# Patient Record
Sex: MALE | Race: WHITE | NOT HISPANIC OR LATINO
[De-identification: names, ages, dates, MRNs, and addresses within clinical notes are randomized per-mention and may not be internally consistent; named-entity substitution may affect disease eponyms.]

---

## 2017-01-09 ENCOUNTER — BH HISTORICAL (OUTPATIENT)
Dept: OTHER | Age: 27
End: 2017-01-09

## 2017-04-10 ENCOUNTER — BH HISTORICAL (OUTPATIENT)
Dept: OTHER | Age: 27
End: 2017-04-10

## 2017-05-25 ENCOUNTER — LAB SERVICES (OUTPATIENT)
Dept: OTHER | Age: 27
End: 2017-05-25

## 2017-05-26 ENCOUNTER — CHARTING TRANS (OUTPATIENT)
Dept: OTHER | Age: 27
End: 2017-05-26

## 2017-05-26 LAB
ALBUMIN SERPL-MCNC: 4.2 G/DL (ref 3.6–5.1)
ALBUMIN/GLOB SERPL: 1.3 (ref 1–2.4)
ALP SERPL-CCNC: 105 UNITS/L (ref 45–117)
ALT SERPL-CCNC: 43 UNITS/L
ANION GAP SERPL CALC-SCNC: 11 MMOL/L (ref 10–20)
AST SERPL-CCNC: 20 UNITS/L
BILIRUB SERPL-MCNC: 1 MG/DL (ref 0.2–1)
BUN SERPL-MCNC: 19 MG/DL (ref 6–20)
BUN/CREAT SERPL: 17 (ref 7–25)
CALCIUM SERPL-MCNC: 9.4 MG/DL (ref 8.4–10.2)
CHLORIDE SERPL-SCNC: 105 MMOL/L (ref 98–107)
CHOLEST SERPL-MCNC: 203 MG/DL
CHOLEST/HDLC SERPL: 4.2
CO2 SERPL-SCNC: 28 MMOL/L (ref 21–32)
CREAT SERPL-MCNC: 1.1 MG/DL (ref 0.67–1.17)
GLOBULIN SER-MCNC: 3.3 G/DL (ref 2–4)
GLUCOSE SERPL-MCNC: 90 MG/DL (ref 65–99)
HDLC SERPL-MCNC: 48 MG/DL
LDLC SERPL CALC-MCNC: 127 MG/DL
LENGTH OF FAST TIME PATIENT: ABNORMAL HRS
LENGTH OF FAST TIME PATIENT: ABNORMAL HRS
NONHDLC SERPL-MCNC: 155 MG/DL
POTASSIUM SERPL-SCNC: 4.4 MMOL/L (ref 3.4–5.1)
SODIUM SERPL-SCNC: 140 MMOL/L (ref 135–145)
TESTOST SERPL-MCNC: 300.9 NG/DL (ref 280–1100)
TOTAL PROTEIN: 7.5 G/DL (ref 6.4–8.2)
TRIGL SERPL-MCNC: 141 MG/DL

## 2017-06-09 ENCOUNTER — CHARTING TRANS (OUTPATIENT)
Dept: OTHER | Age: 27
End: 2017-06-09

## 2017-06-14 ENCOUNTER — BH HISTORICAL (OUTPATIENT)
Dept: OTHER | Age: 27
End: 2017-06-14

## 2017-09-11 ENCOUNTER — BH HISTORICAL (OUTPATIENT)
Dept: OTHER | Age: 27
End: 2017-09-11

## 2017-10-10 ENCOUNTER — LAB SERVICES (OUTPATIENT)
Dept: OTHER | Age: 27
End: 2017-10-10

## 2017-10-10 ENCOUNTER — CHARTING TRANS (OUTPATIENT)
Dept: OTHER | Age: 27
End: 2017-10-10

## 2017-10-13 PROCEDURE — 84403 ASSAY OF TOTAL TESTOSTERONE: CPT | Performed by: UROLOGY

## 2017-10-13 PROCEDURE — 36415 COLL VENOUS BLD VENIPUNCTURE: CPT | Performed by: UROLOGY

## 2017-10-17 ENCOUNTER — CHARTING TRANS (OUTPATIENT)
Dept: OTHER | Age: 27
End: 2017-10-17

## 2017-10-17 LAB — RAPID STREP GROUP A: NORMAL

## 2017-12-11 ENCOUNTER — BH HISTORICAL (OUTPATIENT)
Dept: OTHER | Age: 27
End: 2017-12-11

## 2018-02-12 ENCOUNTER — BH HISTORICAL (OUTPATIENT)
Dept: OTHER | Age: 28
End: 2018-02-12

## 2018-04-23 ENCOUNTER — BH HISTORICAL (OUTPATIENT)
Dept: OTHER | Age: 28
End: 2018-04-23

## 2018-10-09 PROBLEM — F33.42 RECURRENT MAJOR DEPRESSIVE DISORDER, IN FULL REMISSION: Status: ACTIVE | Noted: 2018-10-09

## 2018-10-09 PROBLEM — F33.42 RECURRENT MAJOR DEPRESSIVE DISORDER, IN FULL REMISSION (HCC): Status: ACTIVE | Noted: 2018-10-09

## 2018-11-02 VITALS
TEMPERATURE: 98.1 F | BODY MASS INDEX: 41.75 KG/M2 | HEIGHT: 73 IN | SYSTOLIC BLOOD PRESSURE: 112 MMHG | WEIGHT: 315 LBS | DIASTOLIC BLOOD PRESSURE: 80 MMHG | HEART RATE: 92 BPM

## 2018-11-03 VITALS
HEIGHT: 73 IN | BODY MASS INDEX: 41.75 KG/M2 | SYSTOLIC BLOOD PRESSURE: 126 MMHG | HEART RATE: 84 BPM | DIASTOLIC BLOOD PRESSURE: 80 MMHG | WEIGHT: 315 LBS | OXYGEN SATURATION: 98 % | TEMPERATURE: 99 F | RESPIRATION RATE: 16 BRPM

## 2019-04-19 ENCOUNTER — TELEPHONE (OUTPATIENT)
Dept: BEHAVIORAL HEALTH | Age: 29
End: 2019-04-19

## 2019-04-28 ENCOUNTER — E-ADVICE (OUTPATIENT)
Dept: BEHAVIORAL HEALTH | Age: 29
End: 2019-04-28

## 2019-04-29 RX ORDER — VENLAFAXINE HYDROCHLORIDE 150 MG/1
150 CAPSULE, EXTENDED RELEASE ORAL DAILY
Qty: 90 CAPSULE | Refills: 0 | Status: SHIPPED | OUTPATIENT
Start: 2019-04-29 | End: 2019-07-17 | Stop reason: SDUPTHER

## 2019-04-29 RX ORDER — VENLAFAXINE HYDROCHLORIDE 150 MG/1
150 CAPSULE, EXTENDED RELEASE ORAL DAILY
Qty: 30 CAPSULE | Refills: 0 | Status: SHIPPED | OUTPATIENT
Start: 2019-04-29 | End: 2019-05-15 | Stop reason: SDUPTHER

## 2019-05-15 ENCOUNTER — OFFICE VISIT (OUTPATIENT)
Dept: BEHAVIORAL HEALTH | Age: 29
End: 2019-05-15

## 2019-05-15 DIAGNOSIS — F33.42 MAJOR DEPRESSIVE DISORDER, RECURRENT EPISODE, IN FULL REMISSION (CMD): Primary | ICD-10-CM

## 2019-05-15 PROCEDURE — 99213 OFFICE O/P EST LOW 20 MIN: CPT | Performed by: PSYCHIATRY & NEUROLOGY

## 2019-05-20 ENCOUNTER — LAB ENCOUNTER (OUTPATIENT)
Dept: LAB | Age: 29
End: 2019-05-20
Attending: FAMILY MEDICINE
Payer: COMMERCIAL

## 2019-05-20 ENCOUNTER — OFFICE VISIT (OUTPATIENT)
Dept: FAMILY MEDICINE CLINIC | Facility: CLINIC | Age: 29
End: 2019-05-20
Payer: COMMERCIAL

## 2019-05-20 VITALS
HEART RATE: 76 BPM | WEIGHT: 315 LBS | SYSTOLIC BLOOD PRESSURE: 136 MMHG | DIASTOLIC BLOOD PRESSURE: 88 MMHG | TEMPERATURE: 98 F | HEIGHT: 73 IN | RESPIRATION RATE: 14 BRPM | BODY MASS INDEX: 41.75 KG/M2

## 2019-05-20 DIAGNOSIS — H92.02 ACUTE OTALGIA, LEFT: ICD-10-CM

## 2019-05-20 DIAGNOSIS — R14.0 ABDOMINAL BLOATING: ICD-10-CM

## 2019-05-20 DIAGNOSIS — R14.0 ABDOMINAL BLOATING: Primary | ICD-10-CM

## 2019-05-20 PROCEDURE — 99202 OFFICE O/P NEW SF 15 MIN: CPT | Performed by: FAMILY MEDICINE

## 2019-05-20 PROCEDURE — 80053 COMPREHEN METABOLIC PANEL: CPT

## 2019-05-20 PROCEDURE — 85025 COMPLETE CBC W/AUTO DIFF WBC: CPT

## 2019-05-20 PROCEDURE — 83690 ASSAY OF LIPASE: CPT

## 2019-05-20 PROCEDURE — 36415 COLL VENOUS BLD VENIPUNCTURE: CPT

## 2019-05-20 RX ORDER — SACCHAROMYCES BOULARDII 250 MG
250 CAPSULE ORAL DAILY
COMMUNITY

## 2019-05-20 RX ORDER — MAGNESIUM OXIDE 400 MG (241.3 MG MAGNESIUM) TABLET
400 TABLET DAILY
COMMUNITY

## 2019-05-20 RX ORDER — POTASSIUM CHLORIDE 750 MG/1
10 TABLET, EXTENDED RELEASE ORAL DAILY
COMMUNITY

## 2019-05-20 NOTE — PROGRESS NOTES
[facility] Family Medicine Office Note  Chief Complaint:   Patient presents with:  Ear Problem: left ear decrease hearing  Diarrhea: loose stools x 2 days, feels bloated      HPI:   This is a 30year old male coming in for abdominal bloating and Disp: 30 capsule Rfl: 2      Counseling given: Not Answered       REVIEW OF SYSTEMS:   ROS:  CONSTITUTIONAL:  Denies any unusual weight gain/loss, fever, chills, weakness or fatigue.   HEENT:  Eyes:  Denies visual loss, blurred vision, double vision or yell lower extremities, no edema noted  NEURO:  CN 2 - 12 grossly intact     ASSESSMENT AND PLAN:   1.  Abdominal bloating  -  Etiology unclear  -  Check CMP/CBC/lipase  -  H/o cholecystectomy  -  Check US or CT abdomen if labs are normal and symptoms persist  -

## 2019-06-21 ENCOUNTER — APPOINTMENT (OUTPATIENT)
Dept: LAB | Age: 29
End: 2019-06-21
Attending: FAMILY MEDICINE
Payer: COMMERCIAL

## 2019-06-21 ENCOUNTER — OFFICE VISIT (OUTPATIENT)
Dept: FAMILY MEDICINE CLINIC | Facility: CLINIC | Age: 29
End: 2019-06-21
Payer: COMMERCIAL

## 2019-06-21 VITALS
DIASTOLIC BLOOD PRESSURE: 80 MMHG | BODY MASS INDEX: 43.13 KG/M2 | TEMPERATURE: 100 F | RESPIRATION RATE: 18 BRPM | SYSTOLIC BLOOD PRESSURE: 124 MMHG | HEART RATE: 76 BPM | WEIGHT: 315 LBS | HEIGHT: 71.5 IN

## 2019-06-21 DIAGNOSIS — Z00.00 ROUTINE GENERAL MEDICAL EXAMINATION AT A HEALTH CARE FACILITY: Primary | ICD-10-CM

## 2019-06-21 DIAGNOSIS — E66.01 MORBID OBESITY WITH BMI OF 45.0-49.9, ADULT (HCC): ICD-10-CM

## 2019-06-21 DIAGNOSIS — Z00.00 ROUTINE GENERAL MEDICAL EXAMINATION AT A HEALTH CARE FACILITY: ICD-10-CM

## 2019-06-21 PROCEDURE — 81003 URINALYSIS AUTO W/O SCOPE: CPT

## 2019-06-21 PROCEDURE — 99395 PREV VISIT EST AGE 18-39: CPT | Performed by: FAMILY MEDICINE

## 2019-06-21 PROCEDURE — 36415 COLL VENOUS BLD VENIPUNCTURE: CPT

## 2019-06-21 PROCEDURE — 80061 LIPID PANEL: CPT

## 2019-06-21 PROCEDURE — 84443 ASSAY THYROID STIM HORMONE: CPT

## 2019-06-21 NOTE — PROGRESS NOTES
Mercedes Yuen is a 30year old male who presents for a complete physical exam.   HPI:   Pt complains of nothing today. Denies any recent illnesses or hospitalizations. States his father has diabetes.   He is not aware of any prostate or colon cancer in x10(3) uL    Eosinophil Absolute 0.28 0.00 - 0.70 x10(3) uL    Basophil Absolute 0.08 0.00 - 0.20 x10(3) uL    Immature Granulocyte Absolute 0.03 0.00 - 1.00 x10(3) uL    Neutrophil % 55.1 %    Lymphocyte % 33.2 %    Monocyte % 8.2 %    Eosinophil % 2.5 % denies n/v/d/c/blood in stool  : denies nocturia or changes in stream  MUSCULOSKELETAL: denies back pain  NEURO: denies headaches, denies LH/dizziness/syncope  PSYCHE: denies depression or anxiety  HEMATOLOGIC: denies hx of anemia  ENDOCRINE: denies thyr

## 2019-06-21 NOTE — PATIENT INSTRUCTIONS
Prevention Guidelines, Men Ages 25 to 44  Screening tests and vaccines are an important part of managing your health. A screening test is done to find possible disorders or diseases in people who don't have any symptoms.  The goal is to find a disease ear Vaccines Who needs it How often   Chickenpox (varicella) All men in this age group who have no record of this infection or vaccine 2 doses; the second dose should be given at least 4 weeks after the first dose   Hepatitis A Men at increased risk for infect Sexually transmitted infection prevention Men who are sexually active At routine exams   Skin cancer Prevention of skin cancer in fair-skinned adults through age [address] At routine exams   1Those who are 25years of age, who are not up-to-date on their childhoo · Exercise decreases stress and helps you sleep better. Find out for yourself what time of day works best for you. Make exercise fun  Exercise can be fun. Choose an activity you enjoy.  You may even get a friend to do it with you:  · Take a resistance-brinda · Vegetables and fruits  · Whole-grain or bran breads, pastas, and cereals  · Legumes (beans) and peas  As you start to eat more fiber, be sure to drink plenty of water to keep your digestive system working smoothly.   Tips  Do's and don'ts include:   · Edith Maya · Calories are the fuel your body burns for energy. You get the calories you need from the food you eat. For healthy weight loss, women should eat at least 1,200 calories a day, men at least 1,500.   · When you eat more calories than you need, your body sto © 8176-3201 [employer] [address] [facility], [facility] [place]. All rights reserved. This information is not intended as a substitute for professional medical care. Always follow your healthcare professional's instructions.

## 2019-07-17 ENCOUNTER — TELEPHONE (OUTPATIENT)
Dept: BEHAVIORAL HEALTH | Age: 29
End: 2019-07-17

## 2019-07-17 RX ORDER — VENLAFAXINE HYDROCHLORIDE 150 MG/1
150 CAPSULE, EXTENDED RELEASE ORAL DAILY
Qty: 90 CAPSULE | Refills: 0 | Status: SHIPPED | OUTPATIENT
Start: 2019-07-17 | End: 2019-08-19 | Stop reason: SDUPTHER

## 2019-08-19 ENCOUNTER — OFFICE VISIT (OUTPATIENT)
Dept: BEHAVIORAL HEALTH | Age: 29
End: 2019-08-19

## 2019-08-19 DIAGNOSIS — F33.42 MAJOR DEPRESSIVE DISORDER, RECURRENT EPISODE, IN FULL REMISSION (CMD): Primary | ICD-10-CM

## 2019-08-19 PROCEDURE — 99213 OFFICE O/P EST LOW 20 MIN: CPT | Performed by: PSYCHIATRY & NEUROLOGY

## 2019-08-19 RX ORDER — VENLAFAXINE HYDROCHLORIDE 150 MG/1
150 CAPSULE, EXTENDED RELEASE ORAL DAILY
Qty: 90 CAPSULE | Refills: 0 | Status: SHIPPED | OUTPATIENT
Start: 2019-08-19 | End: 2019-10-16 | Stop reason: SDUPTHER

## 2019-09-25 ENCOUNTER — PATIENT MESSAGE (OUTPATIENT)
Dept: FAMILY MEDICINE CLINIC | Facility: CLINIC | Age: 29
End: 2019-09-25

## 2019-09-26 NOTE — TELEPHONE ENCOUNTER
Absolutely. Please have the patient schedule appointment with Dr. Armond Coronado to have appropriate testing done.

## 2019-09-26 NOTE — TELEPHONE ENCOUNTER
From: Duane Danes  To: Han Vines DO  Sent: 9/25/2019 7:06 PM CDT  Subject: Non-Urgent Medical Question    Hello, I was just wondering, would it be possible to get routine STI testing at the clinic?

## 2019-10-16 ENCOUNTER — TELEPHONE (OUTPATIENT)
Dept: BEHAVIORAL HEALTH | Age: 29
End: 2019-10-16

## 2019-10-16 RX ORDER — VENLAFAXINE HYDROCHLORIDE 150 MG/1
150 CAPSULE, EXTENDED RELEASE ORAL DAILY
Qty: 90 CAPSULE | Refills: 0 | Status: SHIPPED | OUTPATIENT
Start: 2019-10-16 | End: 2019-11-19 | Stop reason: SDUPTHER

## 2019-11-19 ENCOUNTER — OFFICE VISIT (OUTPATIENT)
Dept: BEHAVIORAL HEALTH | Age: 29
End: 2019-11-19

## 2019-11-19 DIAGNOSIS — F33.0 MILD EPISODE OF RECURRENT MAJOR DEPRESSIVE DISORDER (CMD): Primary | ICD-10-CM

## 2019-11-19 PROCEDURE — 99213 OFFICE O/P EST LOW 20 MIN: CPT | Performed by: PSYCHIATRY & NEUROLOGY

## 2019-11-19 RX ORDER — VENLAFAXINE HYDROCHLORIDE 75 MG/1
225 CAPSULE, EXTENDED RELEASE ORAL DAILY
Qty: 270 CAPSULE | Refills: 0 | Status: SHIPPED | OUTPATIENT
Start: 2019-11-19 | End: 2020-03-18 | Stop reason: SDUPTHER

## 2020-02-17 ENCOUNTER — TELEPHONE (OUTPATIENT)
Dept: SCHEDULING | Age: 30
End: 2020-02-17

## 2020-03-18 ENCOUNTER — TELEPHONE (OUTPATIENT)
Dept: BEHAVIORAL HEALTH | Age: 30
End: 2020-03-18

## 2020-03-18 ENCOUNTER — TELEPHONE (OUTPATIENT)
Dept: SCHEDULING | Age: 30
End: 2020-03-18

## 2020-03-18 RX ORDER — VENLAFAXINE HYDROCHLORIDE 75 MG/1
225 CAPSULE, EXTENDED RELEASE ORAL DAILY
Qty: 270 CAPSULE | Refills: 0 | Status: SHIPPED | OUTPATIENT
Start: 2020-03-18 | End: 2020-04-14 | Stop reason: SDUPTHER

## 2020-04-14 ENCOUNTER — TELEPHONIC VISIT (OUTPATIENT)
Dept: BEHAVIORAL HEALTH | Age: 30
End: 2020-04-14

## 2020-04-14 ENCOUNTER — APPOINTMENT (OUTPATIENT)
Dept: BEHAVIORAL HEALTH | Age: 30
End: 2020-04-14

## 2020-04-14 DIAGNOSIS — F33.0 MILD EPISODE OF RECURRENT MAJOR DEPRESSIVE DISORDER (CMD): Primary | ICD-10-CM

## 2020-04-14 RX ORDER — VENLAFAXINE HYDROCHLORIDE 75 MG/1
225 CAPSULE, EXTENDED RELEASE ORAL DAILY
Qty: 270 CAPSULE | Refills: 0 | Status: SHIPPED | OUTPATIENT
Start: 2020-04-14 | End: 2020-09-09 | Stop reason: SDUPTHER

## 2020-08-24 RX ORDER — VENLAFAXINE HYDROCHLORIDE 75 MG/1
225 CAPSULE, EXTENDED RELEASE ORAL DAILY
Qty: 270 CAPSULE | Refills: 0 | OUTPATIENT
Start: 2020-08-24

## 2020-08-24 RX ORDER — VENLAFAXINE HYDROCHLORIDE 150 MG/1
150 CAPSULE, EXTENDED RELEASE ORAL DAILY
Qty: 90 CAPSULE | Refills: 0 | OUTPATIENT
Start: 2020-08-24

## 2020-08-24 RX ORDER — VENLAFAXINE HYDROCHLORIDE 75 MG/1
225 CAPSULE, EXTENDED RELEASE ORAL DAILY
Qty: 270 CAPSULE | Refills: 3 | OUTPATIENT
Start: 2020-08-24

## 2020-09-08 RX ORDER — VENLAFAXINE HYDROCHLORIDE 75 MG/1
225 CAPSULE, EXTENDED RELEASE ORAL DAILY
Qty: 270 CAPSULE | Refills: 3 | OUTPATIENT
Start: 2020-09-08

## 2020-09-09 ENCOUNTER — V-VISIT (OUTPATIENT)
Dept: BEHAVIORAL HEALTH | Age: 30
End: 2020-09-09

## 2020-09-09 DIAGNOSIS — F33.0 MILD EPISODE OF RECURRENT MAJOR DEPRESSIVE DISORDER (CMD): Primary | ICD-10-CM

## 2020-09-09 PROCEDURE — 99213 OFFICE O/P EST LOW 20 MIN: CPT | Performed by: PSYCHIATRY & NEUROLOGY

## 2020-09-09 RX ORDER — VENLAFAXINE HYDROCHLORIDE 75 MG/1
225 CAPSULE, EXTENDED RELEASE ORAL DAILY
Qty: 270 CAPSULE | Refills: 0 | Status: SHIPPED | OUTPATIENT
Start: 2020-09-09 | End: 2020-12-07 | Stop reason: SDUPTHER

## 2020-11-02 RX ORDER — VENLAFAXINE HYDROCHLORIDE 75 MG/1
225 CAPSULE, EXTENDED RELEASE ORAL DAILY
Qty: 270 CAPSULE | Refills: 3 | OUTPATIENT
Start: 2020-11-02

## 2020-11-16 RX ORDER — VENLAFAXINE HYDROCHLORIDE 75 MG/1
225 CAPSULE, EXTENDED RELEASE ORAL DAILY
Qty: 270 CAPSULE | Refills: 3 | OUTPATIENT
Start: 2020-11-16

## 2020-12-07 ENCOUNTER — V-VISIT (OUTPATIENT)
Dept: BEHAVIORAL HEALTH | Age: 30
End: 2020-12-07

## 2020-12-07 DIAGNOSIS — F33.0 MILD EPISODE OF RECURRENT MAJOR DEPRESSIVE DISORDER (CMD): Primary | ICD-10-CM

## 2020-12-07 PROCEDURE — 99214 OFFICE O/P EST MOD 30 MIN: CPT | Performed by: PSYCHIATRY & NEUROLOGY

## 2020-12-07 RX ORDER — VENLAFAXINE HYDROCHLORIDE 75 MG/1
225 CAPSULE, EXTENDED RELEASE ORAL DAILY
Qty: 270 CAPSULE | Refills: 0 | Status: SHIPPED | OUTPATIENT
Start: 2020-12-07 | End: 2021-01-13 | Stop reason: SDUPTHER

## 2021-01-13 RX ORDER — VENLAFAXINE HYDROCHLORIDE 75 MG/1
225 CAPSULE, EXTENDED RELEASE ORAL DAILY
Qty: 270 CAPSULE | Refills: 0 | Status: SHIPPED | OUTPATIENT
Start: 2021-01-13 | End: 2021-02-15

## 2021-01-27 ENCOUNTER — PATIENT MESSAGE (OUTPATIENT)
Dept: FAMILY MEDICINE CLINIC | Facility: CLINIC | Age: 31
End: 2021-01-27

## 2021-01-27 NOTE — TELEPHONE ENCOUNTER
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 How do I go about getting that? Thank you for your time.

## 2021-01-28 ENCOUNTER — TELEPHONE (OUTPATIENT)
Dept: FAMILY MEDICINE CLINIC | Facility: CLINIC | Age: 31
End: 2021-01-28

## 2021-02-02 RX ORDER — VENLAFAXINE HYDROCHLORIDE 75 MG/1
225 CAPSULE, EXTENDED RELEASE ORAL DAILY
Qty: 270 CAPSULE | Refills: 3 | OUTPATIENT
Start: 2021-02-02

## 2021-02-15 RX ORDER — VENLAFAXINE HYDROCHLORIDE 75 MG/1
225 CAPSULE, EXTENDED RELEASE ORAL DAILY
Qty: 270 CAPSULE | Refills: 0 | Status: SHIPPED | OUTPATIENT
Start: 2021-02-15 | End: 2021-03-08 | Stop reason: SDUPTHER

## 2021-03-08 ENCOUNTER — V-VISIT (OUTPATIENT)
Dept: BEHAVIORAL HEALTH | Age: 31
End: 2021-03-08

## 2021-03-08 DIAGNOSIS — F33.42 RECURRENT MAJOR DEPRESSIVE DISORDER, IN FULL REMISSION (CMD): Primary | ICD-10-CM

## 2021-03-08 PROBLEM — F33.0 MILD EPISODE OF RECURRENT MAJOR DEPRESSIVE DISORDER (CMD): Status: RESOLVED | Noted: 2019-05-15 | Resolved: 2021-03-08

## 2021-03-08 PROCEDURE — 99214 OFFICE O/P EST MOD 30 MIN: CPT | Performed by: PSYCHIATRY & NEUROLOGY

## 2021-03-08 RX ORDER — VENLAFAXINE HYDROCHLORIDE 75 MG/1
225 CAPSULE, EXTENDED RELEASE ORAL DAILY
Qty: 270 CAPSULE | Refills: 1 | Status: SHIPPED | OUTPATIENT
Start: 2021-03-08 | End: 2022-03-09 | Stop reason: SDUPTHER

## 2021-03-20 ENCOUNTER — IMMUNIZATION (OUTPATIENT)
Dept: LAB | Age: 31
End: 2021-03-20
Attending: HOSPITALIST
Payer: COMMERCIAL

## 2021-03-20 DIAGNOSIS — Z23 NEED FOR VACCINATION: Primary | ICD-10-CM

## 2021-03-20 PROCEDURE — 0001A SARSCOV2 VAC 30MCG/0.3ML IM: CPT

## 2021-04-10 ENCOUNTER — IMMUNIZATION (OUTPATIENT)
Dept: LAB | Age: 31
End: 2021-04-10
Attending: HOSPITALIST
Payer: COMMERCIAL

## 2021-04-10 DIAGNOSIS — Z23 NEED FOR VACCINATION: Primary | ICD-10-CM

## 2021-04-10 PROCEDURE — 0002A SARSCOV2 VAC 30MCG/0.3ML IM: CPT

## 2021-09-21 RX ORDER — VENLAFAXINE HYDROCHLORIDE 75 MG/1
225 CAPSULE, EXTENDED RELEASE ORAL DAILY
Qty: 270 CAPSULE | Refills: 3 | OUTPATIENT
Start: 2021-09-21

## 2021-10-07 RX ORDER — VENLAFAXINE HYDROCHLORIDE 75 MG/1
225 CAPSULE, EXTENDED RELEASE ORAL DAILY
Qty: 270 CAPSULE | Refills: 3 | OUTPATIENT
Start: 2021-10-07

## 2021-10-11 ENCOUNTER — APPOINTMENT (OUTPATIENT)
Dept: BEHAVIORAL HEALTH | Age: 31
End: 2021-10-11

## 2022-05-04 RX ORDER — VENLAFAXINE HYDROCHLORIDE 75 MG/1
225 CAPSULE, EXTENDED RELEASE ORAL DAILY
Qty: 270 CAPSULE | Refills: 0 | Status: SHIPPED | OUTPATIENT
Start: 2022-05-04 | End: 2022-07-28

## 2022-06-06 ENCOUNTER — BEHAVIORAL HEALTH (OUTPATIENT)
Dept: BEHAVIORAL HEALTH | Age: 32
End: 2022-06-06

## 2022-06-06 DIAGNOSIS — F33.42 RECURRENT MAJOR DEPRESSIVE DISORDER, IN FULL REMISSION (CMD): Primary | ICD-10-CM

## 2022-06-06 PROCEDURE — 99214 OFFICE O/P EST MOD 30 MIN: CPT | Performed by: PSYCHIATRY & NEUROLOGY

## 2022-07-28 RX ORDER — VENLAFAXINE HYDROCHLORIDE 75 MG/1
225 CAPSULE, EXTENDED RELEASE ORAL DAILY
Qty: 270 CAPSULE | Refills: 0 | Status: SHIPPED | OUTPATIENT
Start: 2022-07-28 | End: 2022-10-20 | Stop reason: SDUPTHER

## 2022-10-06 ENCOUNTER — OFFICE VISIT (OUTPATIENT)
Dept: FAMILY MEDICINE CLINIC | Facility: CLINIC | Age: 32
End: 2022-10-06
Payer: COMMERCIAL

## 2022-10-06 VITALS
TEMPERATURE: 98 F | HEART RATE: 92 BPM | HEIGHT: 73 IN | RESPIRATION RATE: 18 BRPM | BODY MASS INDEX: 41.75 KG/M2 | DIASTOLIC BLOOD PRESSURE: 76 MMHG | WEIGHT: 315 LBS | SYSTOLIC BLOOD PRESSURE: 106 MMHG

## 2022-10-06 DIAGNOSIS — J35.1 ENLARGED TONSILS: Primary | ICD-10-CM

## 2022-10-06 PROCEDURE — 3008F BODY MASS INDEX DOCD: CPT | Performed by: FAMILY MEDICINE

## 2022-10-06 PROCEDURE — 3078F DIAST BP <80 MM HG: CPT | Performed by: FAMILY MEDICINE

## 2022-10-06 PROCEDURE — 3074F SYST BP LT 130 MM HG: CPT | Performed by: FAMILY MEDICINE

## 2022-10-06 PROCEDURE — 99213 OFFICE O/P EST LOW 20 MIN: CPT | Performed by: FAMILY MEDICINE

## 2022-10-06 RX ORDER — VENLAFAXINE HYDROCHLORIDE 150 MG/1
150 CAPSULE, EXTENDED RELEASE ORAL DAILY
COMMUNITY
End: 2022-10-06

## 2022-10-06 RX ORDER — VENLAFAXINE HYDROCHLORIDE 75 MG/1
225 CAPSULE, EXTENDED RELEASE ORAL DAILY
COMMUNITY
Start: 2015-01-01

## 2022-10-20 ENCOUNTER — TELEPHONE (OUTPATIENT)
Dept: BEHAVIORAL HEALTH | Age: 32
End: 2022-10-20

## 2022-10-20 RX ORDER — VENLAFAXINE HYDROCHLORIDE 75 MG/1
225 CAPSULE, EXTENDED RELEASE ORAL DAILY
Qty: 270 CAPSULE | Refills: 0 | Status: SHIPPED | OUTPATIENT
Start: 2022-10-20 | End: 2022-10-24 | Stop reason: SDUPTHER

## 2022-10-21 ENCOUNTER — E-ADVICE (OUTPATIENT)
Dept: BEHAVIORAL HEALTH | Age: 32
End: 2022-10-21

## 2022-10-24 ENCOUNTER — TELEPHONE (OUTPATIENT)
Dept: BEHAVIORAL HEALTH | Age: 32
End: 2022-10-24

## 2022-10-24 ENCOUNTER — BEHAVIORAL HEALTH (OUTPATIENT)
Dept: BEHAVIORAL HEALTH | Age: 32
End: 2022-10-24

## 2022-10-24 DIAGNOSIS — F33.42 RECURRENT MAJOR DEPRESSIVE DISORDER, IN FULL REMISSION (CMD): Primary | ICD-10-CM

## 2022-10-24 PROCEDURE — 99214 OFFICE O/P EST MOD 30 MIN: CPT | Performed by: PSYCHIATRY & NEUROLOGY

## 2022-10-24 RX ORDER — VENLAFAXINE HYDROCHLORIDE 75 MG/1
225 CAPSULE, EXTENDED RELEASE ORAL DAILY
Qty: 270 CAPSULE | Refills: 0 | Status: SHIPPED | OUTPATIENT
Start: 2022-10-24 | End: 2023-05-30

## 2023-05-30 RX ORDER — VENLAFAXINE HYDROCHLORIDE 75 MG/1
225 CAPSULE, EXTENDED RELEASE ORAL DAILY
Qty: 270 CAPSULE | Refills: 0 | Status: SHIPPED | OUTPATIENT
Start: 2023-05-30 | End: 2023-07-10 | Stop reason: SDUPTHER

## 2023-07-10 ENCOUNTER — BEHAVIORAL HEALTH (OUTPATIENT)
Dept: BEHAVIORAL HEALTH | Age: 33
End: 2023-07-10

## 2023-07-10 DIAGNOSIS — F33.42 RECURRENT MAJOR DEPRESSIVE DISORDER, IN FULL REMISSION (CMD): Primary | ICD-10-CM

## 2023-07-10 PROCEDURE — 99214 OFFICE O/P EST MOD 30 MIN: CPT | Performed by: PSYCHIATRY & NEUROLOGY

## 2023-07-10 RX ORDER — VENLAFAXINE HYDROCHLORIDE 37.5 MG/1
37.5 CAPSULE, EXTENDED RELEASE ORAL DAILY
Qty: 270 CAPSULE | Refills: 0 | Status: SHIPPED | OUTPATIENT
Start: 2023-07-10

## 2023-07-22 ENCOUNTER — E-ADVICE (OUTPATIENT)
Dept: BEHAVIORAL HEALTH | Age: 33
End: 2023-07-22

## 2023-07-25 RX ORDER — VENLAFAXINE HYDROCHLORIDE 75 MG/1
225 CAPSULE, EXTENDED RELEASE ORAL DAILY
Qty: 270 CAPSULE | Refills: 0 | Status: SHIPPED | OUTPATIENT
Start: 2023-07-25

## 2023-09-07 ENCOUNTER — MED REC SCAN ONLY (OUTPATIENT)
Dept: FAMILY MEDICINE CLINIC | Facility: CLINIC | Age: 33
End: 2023-09-07

## 2024-02-14 ENCOUNTER — TELEPHONE (OUTPATIENT)
Dept: BEHAVIORAL HEALTH | Age: 34
End: 2024-02-14

## 2024-02-27 ENCOUNTER — APPOINTMENT (OUTPATIENT)
Dept: BEHAVIORAL HEALTH | Age: 34
End: 2024-02-27

## 2024-02-27 DIAGNOSIS — F33.42 RECURRENT MAJOR DEPRESSIVE DISORDER, IN FULL REMISSION (CMD): Primary | ICD-10-CM

## 2024-02-27 PROCEDURE — 99214 OFFICE O/P EST MOD 30 MIN: CPT | Performed by: PSYCHIATRY & NEUROLOGY

## 2024-03-15 RX ORDER — VENLAFAXINE HYDROCHLORIDE 75 MG/1
225 CAPSULE, EXTENDED RELEASE ORAL DAILY
Qty: 90 CAPSULE | Refills: 0 | Status: SHIPPED | OUTPATIENT
Start: 2024-03-15

## 2024-04-22 ENCOUNTER — E-ADVICE (OUTPATIENT)
Dept: BEHAVIORAL HEALTH | Age: 34
End: 2024-04-22

## 2024-04-22 RX ORDER — VENLAFAXINE HYDROCHLORIDE 75 MG/1
225 CAPSULE, EXTENDED RELEASE ORAL DAILY
Qty: 270 CAPSULE | Refills: 0 | Status: SHIPPED | OUTPATIENT
Start: 2024-04-22

## 2024-05-22 ENCOUNTER — APPOINTMENT (OUTPATIENT)
Dept: BEHAVIORAL HEALTH | Age: 34
End: 2024-05-22

## 2024-05-22 DIAGNOSIS — F33.42 RECURRENT MAJOR DEPRESSIVE DISORDER, IN FULL REMISSION (CMD): Primary | ICD-10-CM

## 2024-05-22 PROCEDURE — 99214 OFFICE O/P EST MOD 30 MIN: CPT | Performed by: PSYCHIATRY & NEUROLOGY

## 2024-06-17 ENCOUNTER — LAB ENCOUNTER (OUTPATIENT)
Dept: LAB | Age: 34
End: 2024-06-17
Attending: FAMILY MEDICINE

## 2024-06-17 ENCOUNTER — OFFICE VISIT (OUTPATIENT)
Dept: FAMILY MEDICINE CLINIC | Facility: CLINIC | Age: 34
End: 2024-06-17
Payer: COMMERCIAL

## 2024-06-17 VITALS
DIASTOLIC BLOOD PRESSURE: 90 MMHG | BODY MASS INDEX: 41.75 KG/M2 | WEIGHT: 315 LBS | HEIGHT: 73 IN | TEMPERATURE: 98 F | SYSTOLIC BLOOD PRESSURE: 160 MMHG | HEART RATE: 77 BPM | RESPIRATION RATE: 16 BRPM

## 2024-06-17 DIAGNOSIS — E66.01 MORBID OBESITY (HCC): ICD-10-CM

## 2024-06-17 DIAGNOSIS — Z00.00 LABORATORY EXAMINATION ORDERED AS PART OF A ROUTINE GENERAL MEDICAL EXAMINATION: ICD-10-CM

## 2024-06-17 DIAGNOSIS — R79.89 LOW TESTOSTERONE IN MALE: ICD-10-CM

## 2024-06-17 DIAGNOSIS — Z00.00 LABORATORY EXAMINATION ORDERED AS PART OF A ROUTINE GENERAL MEDICAL EXAMINATION: Primary | ICD-10-CM

## 2024-06-17 LAB
ALBUMIN SERPL-MCNC: 4 G/DL (ref 3.4–5)
ALBUMIN/GLOB SERPL: 1 {RATIO} (ref 1–2)
ALP LIVER SERPL-CCNC: 106 U/L
ALT SERPL-CCNC: 35 U/L
ANION GAP SERPL CALC-SCNC: 7 MMOL/L (ref 0–18)
AST SERPL-CCNC: 19 U/L (ref 15–37)
BASOPHILS # BLD AUTO: 0.06 X10(3) UL (ref 0–0.2)
BASOPHILS NFR BLD AUTO: 0.6 %
BILIRUB SERPL-MCNC: 0.9 MG/DL (ref 0.1–2)
BILIRUB UR QL STRIP.AUTO: NEGATIVE
BUN BLD-MCNC: 14 MG/DL (ref 9–23)
CALCIUM BLD-MCNC: 9.3 MG/DL (ref 8.5–10.1)
CHLORIDE SERPL-SCNC: 106 MMOL/L (ref 98–112)
CHOLEST SERPL-MCNC: 166 MG/DL (ref ?–200)
CLARITY UR REFRACT.AUTO: CLEAR
CO2 SERPL-SCNC: 26 MMOL/L (ref 21–32)
CREAT BLD-MCNC: 1.18 MG/DL
EGFRCR SERPLBLD CKD-EPI 2021: 82 ML/MIN/1.73M2 (ref 60–?)
EOSINOPHIL # BLD AUTO: 0.14 X10(3) UL (ref 0–0.7)
EOSINOPHIL NFR BLD AUTO: 1.4 %
ERYTHROCYTE [DISTWIDTH] IN BLOOD BY AUTOMATED COUNT: 13.3 %
FASTING PATIENT LIPID ANSWER: NO
FASTING STATUS PATIENT QL REPORTED: NO
GLOBULIN PLAS-MCNC: 4 G/DL (ref 2.8–4.4)
GLUCOSE BLD-MCNC: 99 MG/DL (ref 70–99)
GLUCOSE UR STRIP.AUTO-MCNC: NORMAL MG/DL
HCT VFR BLD AUTO: 45.7 %
HDLC SERPL-MCNC: 40 MG/DL (ref 40–59)
HGB BLD-MCNC: 15.4 G/DL
IMM GRANULOCYTES # BLD AUTO: 0.03 X10(3) UL (ref 0–1)
IMM GRANULOCYTES NFR BLD: 0.3 %
KETONES UR STRIP.AUTO-MCNC: NEGATIVE MG/DL
LDLC SERPL CALC-MCNC: 103 MG/DL (ref ?–100)
LEUKOCYTE ESTERASE UR QL STRIP.AUTO: NEGATIVE
LYMPHOCYTES # BLD AUTO: 3.04 X10(3) UL (ref 1–4)
LYMPHOCYTES NFR BLD AUTO: 30.4 %
MCH RBC QN AUTO: 29.8 PG (ref 26–34)
MCHC RBC AUTO-ENTMCNC: 33.7 G/DL (ref 31–37)
MCV RBC AUTO: 88.6 FL
MONOCYTES # BLD AUTO: 0.71 X10(3) UL (ref 0.1–1)
MONOCYTES NFR BLD AUTO: 7.1 %
NEUTROPHILS # BLD AUTO: 6.01 X10 (3) UL (ref 1.5–7.7)
NEUTROPHILS # BLD AUTO: 6.01 X10(3) UL (ref 1.5–7.7)
NEUTROPHILS NFR BLD AUTO: 60.2 %
NITRITE UR QL STRIP.AUTO: NEGATIVE
NONHDLC SERPL-MCNC: 126 MG/DL (ref ?–130)
OSMOLALITY SERPL CALC.SUM OF ELEC: 289 MOSM/KG (ref 275–295)
PH UR STRIP.AUTO: 5.5 [PH] (ref 5–8)
PLATELET # BLD AUTO: 318 10(3)UL (ref 150–450)
POTASSIUM SERPL-SCNC: 4.2 MMOL/L (ref 3.5–5.1)
PROT SERPL-MCNC: 8 G/DL (ref 6.4–8.2)
PROT UR STRIP.AUTO-MCNC: NEGATIVE MG/DL
RBC # BLD AUTO: 5.16 X10(6)UL
RBC UR QL AUTO: NEGATIVE
SODIUM SERPL-SCNC: 139 MMOL/L (ref 136–145)
SP GR UR STRIP.AUTO: 1.01 (ref 1–1.03)
TRIGL SERPL-MCNC: 130 MG/DL (ref 30–149)
TSI SER-ACNC: 1.54 MIU/ML (ref 0.36–3.74)
UROBILINOGEN UR STRIP.AUTO-MCNC: NORMAL MG/DL
VLDLC SERPL CALC-MCNC: 22 MG/DL (ref 0–30)
WBC # BLD AUTO: 10 X10(3) UL (ref 4–11)

## 2024-06-17 PROCEDURE — 3008F BODY MASS INDEX DOCD: CPT | Performed by: FAMILY MEDICINE

## 2024-06-17 PROCEDURE — 99395 PREV VISIT EST AGE 18-39: CPT | Performed by: FAMILY MEDICINE

## 2024-06-17 PROCEDURE — 90715 TDAP VACCINE 7 YRS/> IM: CPT | Performed by: FAMILY MEDICINE

## 2024-06-17 PROCEDURE — 80050 GENERAL HEALTH PANEL: CPT | Performed by: FAMILY MEDICINE

## 2024-06-17 PROCEDURE — 80061 LIPID PANEL: CPT | Performed by: FAMILY MEDICINE

## 2024-06-17 PROCEDURE — 81003 URINALYSIS AUTO W/O SCOPE: CPT | Performed by: FAMILY MEDICINE

## 2024-06-17 PROCEDURE — 3077F SYST BP >= 140 MM HG: CPT | Performed by: FAMILY MEDICINE

## 2024-06-17 PROCEDURE — 90471 IMMUNIZATION ADMIN: CPT | Performed by: FAMILY MEDICINE

## 2024-06-17 PROCEDURE — 3080F DIAST BP >= 90 MM HG: CPT | Performed by: FAMILY MEDICINE

## 2024-06-17 RX ORDER — FEXOFENADINE HCL 180 MG/1
180 TABLET ORAL DAILY
COMMUNITY

## 2024-06-17 RX ORDER — AZELASTINE HYDROCHLORIDE, FLUTICASONE PROPIONATE 137; 50 UG/1; UG/1
2 SPRAY, METERED NASAL DAILY
COMMUNITY
Start: 2024-01-01

## 2024-06-17 RX ORDER — EPINEPHRINE 0.3 MG/.3ML
0.3 INJECTION SUBCUTANEOUS AS NEEDED
COMMUNITY
Start: 2024-04-02

## 2024-06-17 NOTE — PROGRESS NOTES
Jorge Cristina is a 34 year old male who presents for a complete physical exam.   HPI:   Pt complains of low energy today.  Would like his testosterone level checked due to h/o orchiectomy and low testosterone in the past.  Denies any recent illnesses or hospitalizations.  States his father has diabetes.  He is not aware of any prostate or colon cancer in the family.  His tetanus vaccine is not up-to-date.  He has a history of depression is currently taking Effexor.  He is monitored by psychiatry.    Wt Readings from Last 6 Encounters:   06/17/24 (!) 364 lb (165.1 kg)   10/06/22 (!) 338 lb (153.3 kg)   06/21/19 (!) 346 lb (156.9 kg)   05/20/19 (!) 347 lb (157.4 kg)   09/21/17 (!) 330 lb (149.7 kg)     Body mass index is 48.02 kg/m².     Results for orders placed or performed in visit on 06/21/19   LIPID PANEL   Result Value Ref Range    Cholesterol, Total 164 <200 mg/dL    HDL Cholesterol 41 40 - 59 mg/dL    Triglycerides 111 30 - 149 mg/dL    LDL Cholesterol 101 (H) <100 mg/dL    VLDL 22 0 - 30 mg/dL    Non HDL Chol 123 <130 mg/dL   UA/M With Culture Reflex [E]    Specimen: Urine   Result Value Ref Range    Urine Color Yellow Yellow    Clarity Urine Clear Clear    Spec Gravity 1.014 1.001 - 1.030    Glucose Urine Negative Negative mg/dl    Bilirubin Urine Negative Negative    Ketones Urine Negative Negative mg/dL    Blood Urine Negative Negative    pH Urine 6.0 4.5 - 8.0    Protein Urine Negative Negative mg/dl    Urobilinogen Urine <2.0 0.2 - 2.0 mg/dL    Nitrite Urine Negative Negative    Leukocyte Esterase Urine Negative Negative    Microscopic Microscopic not indicated    TSH W Reflex To Free T4 [E]   Result Value Ref Range    TSH 1.690 0.358 - 3.740 mIU/mL         Current Outpatient Medications   Medication Sig Dispense Refill    Azelastine-Fluticasone 137-50 MCG/ACT Nasal Suspension 2 sprays by Nasal route daily.      EPINEPHrine 0.3 MG/0.3ML Injection Solution Auto-injector Inject 0.3 mL (1 each total)  into the muscle as needed.      fexofenadine 180 MG Oral Tab Take 1 tablet (180 mg total) by mouth daily.      PSYLLIUM HUSK OR       Misc Natural Products (BEET ROOT OR)       MAGNESIUM GLYCINATE OR       Amylase-Lipase-Protease (DIGESTIVE ENZYMES OR)       venlafaxine ER 75 MG Oral Capsule SR 24 Hr Take 3 capsules (225 mg total) by mouth daily.        Allergies   Allergen Reactions    Cats Claw, Uncaria Tomentosa SHORTNESS OF BREATH    Ciprofloxacin NAUSEA ONLY    Shellfish Allergy ITCHING      History reviewed. No pertinent past medical history.   Past Surgical History:   Procedure Laterality Date    Other surgical history      gallbladder surgery    Other surgical history      wrist surgery      History reviewed. No pertinent family history.   Social History:  Social History     Socioeconomic History    Marital status: Single   Tobacco Use    Smoking status: Never    Smokeless tobacco: Never   Vaping Use    Vaping status: Never Used   Substance and Sexual Activity    Alcohol use: Yes     Comment: rarely    Drug use: Never    Sexual activity: Yes      Occ: nuclear pharmacist. : single. Children: none.   Exercise: minimal.  Diet: watches fats closely     REVIEW OF SYSTEMS:   GENERAL: feels well otherwise  SKIN: denies any unusual skin lesions  EYES:denies blurred vision or double vision  HEENT: denies nasal congestion, sinus pain or ST  LUNGS: denies shortness of breath with exertion, denies cough  CARDIOVASCULAR: denies chest pain on exertion or at rest, denies palpitations  GI: denies abdominal pain,denies heartburn, denies n/v/d/c/blood in stool  : denies nocturia or changes in stream  MUSCULOSKELETAL: denies back pain  NEURO: denies headaches, denies LH/dizziness/syncope  PSYCHE: denies depression or anxiety  HEMATOLOGIC: denies hx of anemia  ENDOCRINE: denies thyroid history  ALL/ASTHMA: denies hx of allergy or asthma    EXAM:   /90   Pulse 77   Temp 97.5 °F (36.4 °C) (Temporal)   Resp 16    Ht 6' 1\" (1.854 m)   Wt (!) 364 lb (165.1 kg)   BMI 48.02 kg/m²   Body mass index is 48.02 kg/m².   GENERAL: well developed, well nourished,in no apparent distress  SKIN: no rashes,no suspicious lesions  HEENT: atraumatic, normocephalic,ears and throat are clear  EYES:PERRLA, EOMI, conjunctiva are clear  NECK: supple,no adenopathy, no thyromegaly  LUNGS: clear to auscultation, no r/r/w  CARDIO: RRR without murmur  GI: good BS's,no masses, HSM or tenderness  :  Left testicle normal, right prosthetic testicle,no masses,no hernia,no penile lesions  MUSCULOSKELETAL: back is not tender,FROM of the back  EXTREMITIES: no cyanosis, clubbing or edema  NEURO: Oriented times three,cranial nerves are intact,motor and sensory are grossly intact; 2 + knee reflexes bilaterally  VASCULAR: 2 + dorsalis pedal pulses bilaterally    ASSESSMENT AND PLAN:   Jorge Cristina is a 34 year old male who presents for a complete physical exam.    Pt's weight is Body mass index is 48.02 kg/m²., recommended low fat diet and aerobic exercise 30 minutes three times weekly.   Health maintenance, will check:   Orders Placed This Encounter   Procedures    CBC With Differential With Platelet    Comp Metabolic Panel (14)    Lipid Panel    Urinalysis, Routine    TSH W Reflex To Free T4    Testosterone Total [E]    TdaP (Adacel, Boostrix) [02309]       Tdap given today  Morbid obesity - encourage low fat diet and exercise, refer to weight loss clinic  The patient indicates understanding of these issues and agrees to the plan.  The patient is asked to return in 6 months pending lab results.

## 2024-08-13 ENCOUNTER — TELEPHONE (OUTPATIENT)
Dept: BEHAVIORAL HEALTH | Age: 34
End: 2024-08-13

## 2024-08-14 ENCOUNTER — BEHAVIORAL HEALTH (OUTPATIENT)
Dept: BEHAVIORAL HEALTH | Age: 34
End: 2024-08-14

## 2024-08-14 DIAGNOSIS — F33.42 RECURRENT MAJOR DEPRESSIVE DISORDER, IN FULL REMISSION (CMD): Primary | ICD-10-CM

## 2024-08-14 PROCEDURE — 99214 OFFICE O/P EST MOD 30 MIN: CPT | Performed by: PSYCHIATRY & NEUROLOGY

## 2024-08-14 RX ORDER — VENLAFAXINE HYDROCHLORIDE 75 MG/1
225 CAPSULE, EXTENDED RELEASE ORAL DAILY
Qty: 270 CAPSULE | Refills: 0 | Status: SHIPPED | OUTPATIENT
Start: 2024-08-14 | End: 2024-08-14 | Stop reason: SDUPTHER

## 2024-08-14 RX ORDER — VENLAFAXINE HYDROCHLORIDE 75 MG/1
225 CAPSULE, EXTENDED RELEASE ORAL DAILY
Qty: 270 CAPSULE | Refills: 0 | Status: SHIPPED | OUTPATIENT
Start: 2024-08-14

## 2024-10-07 ENCOUNTER — E-ADVICE (OUTPATIENT)
Dept: ADMINISTRATIVE | Age: 34
End: 2024-10-07

## 2024-10-14 ENCOUNTER — OFFICE VISIT (OUTPATIENT)
Dept: FAMILY MEDICINE CLINIC | Facility: CLINIC | Age: 34
End: 2024-10-14
Payer: COMMERCIAL

## 2024-10-14 VITALS
HEIGHT: 73 IN | RESPIRATION RATE: 16 BRPM | HEART RATE: 84 BPM | BODY MASS INDEX: 41.75 KG/M2 | DIASTOLIC BLOOD PRESSURE: 84 MMHG | SYSTOLIC BLOOD PRESSURE: 130 MMHG | TEMPERATURE: 98 F | WEIGHT: 315 LBS

## 2024-10-14 DIAGNOSIS — R79.89 LOW TESTOSTERONE IN MALE: ICD-10-CM

## 2024-10-14 DIAGNOSIS — M65.331 TRIGGER FINGER, RIGHT MIDDLE FINGER: Primary | ICD-10-CM

## 2024-10-14 PROCEDURE — 99214 OFFICE O/P EST MOD 30 MIN: CPT | Performed by: FAMILY MEDICINE

## 2024-10-14 PROCEDURE — G2211 COMPLEX E/M VISIT ADD ON: HCPCS | Performed by: FAMILY MEDICINE

## 2024-10-14 PROCEDURE — 3075F SYST BP GE 130 - 139MM HG: CPT | Performed by: FAMILY MEDICINE

## 2024-10-14 PROCEDURE — 3079F DIAST BP 80-89 MM HG: CPT | Performed by: FAMILY MEDICINE

## 2024-10-14 PROCEDURE — 3008F BODY MASS INDEX DOCD: CPT | Performed by: FAMILY MEDICINE

## 2024-10-14 NOTE — PROGRESS NOTES
[facility] Family Medicine Office Note  Chief Complaint:   Chief Complaint   Patient presents with    Finger Pain     Right index finger pain for 2-3 weeks, possibly due to work. Also wanted to check testosterone levels.        HPI:   This is a 34 year old male coming in for trigger finger of the right middle finger for the last 2 to 3 weeks.  Patient is unsure what is causing it but does use his hands a lot for work.  States it is worse in the morning and the right middle finger is constantly getting stuck and is painful.  Denies any other injuries.  Denies any redness or swelling.    Patient also has a history of testicular cancer and low testosterone levels.  He is overdue to have his testosterone level checked again and is asking for blood work to be done.      History reviewed. No pertinent past medical history.  Past Surgical History:   Procedure Laterality Date    Other surgical history      gallbladder surgery    Other surgical history      wrist surgery     Social History:  Social History     Socioeconomic History    Marital status: Single   Tobacco Use    Smoking status: Never    Smokeless tobacco: Never   Vaping Use    Vaping status: Never Used   Substance and Sexual Activity    Alcohol use: Yes     Comment: rarely    Drug use: Never    Sexual activity: Yes     Family History:  History reviewed. No pertinent family history.  Allergies:  Allergies[1]  Current Meds:  Current Outpatient Medications   Medication Sig Dispense Refill    Azelastine-Fluticasone 137-50 MCG/ACT Nasal Suspension 2 sprays by Nasal route daily.      EPINEPHrine 0.3 MG/0.3ML Injection Solution Auto-injector Inject 0.3 mL (1 each total) into the muscle as needed.      fexofenadine 180 MG Oral Tab Take 1 tablet (180 mg total) by mouth daily.      PSYLLIUM HUSK OR       Misc Natural Products (BEET ROOT OR)       MAGNESIUM GLYCINATE OR       Amylase-Lipase-Protease (DIGESTIVE ENZYMES OR)       venlafaxine ER 75 MG Oral Capsule SR  24 Hr Take 3 capsules (225 mg total) by mouth daily.        Counseling given: Not Answered       REVIEW OF SYSTEMS:   ROS:  CONSTITUTIONAL:  Denies any unusual weight gain/loss, fever, chills, weakness or fatigue.  CARDIOVASCULAR:  Denies chest pain, chest pressure or chest discomfort. No palpitations or edema.  Denies any dyspnea on exertion or at rest  RESPIRATORY:  Denies shortness of breath, cough  GASTROINTESTINAL:  Denies any abdominal pain, nausea, vomiting  NEUROLOGICAL:  Denies headache, dizziness, syncope, numbness or tingling in the extremities.  MUSCULOSKELETAL:  + right middle finger pain    EXAM:   /84   Pulse 84   Temp 97.6 °F (36.4 °C) (Temporal)   Resp 16   Ht 6' 1\" (1.854 m)   Wt (!) 360 lb 6.4 oz (163.5 kg)   BMI 47.55 kg/m²  Estimated body mass index is 47.55 kg/m² as calculated from the following:    Height as of this encounter: 6' 1\" (1.854 m).    Weight as of this encounter: 360 lb 6.4 oz (163.5 kg).   Vital signs reviewed.Appears stated age, well groomed.  Physical Exam:  GEN:  Patient is alert and oriented x3, no apparent distress  HEAD:  Normocephalic, atraumatic  LUNGS: clear to auscultation bilaterally, no rales/rhonchi/wheezing  HEART:  Regular rate and rhythm, no murmurs, rubs or gallops  ABDOMEN:  Soft, nondistended, nontender, bowel sounds normal in all 4 quadrants, no hepatosplenomegaly  EXTREMITIES:  Strength intact with 5/5 bilaterally upper and lower extremities, no edema noted  NEURO:  CN 2 - 12 grossly intact     ASSESSMENT AND PLAN:   1. Trigger finger, right middle finger  -  refer to ortho hand surgery for treatment options  - Ortho Referral - In Network    2. Low testosterone in male  -  recheck testosterone level  - Testosterone Total [E]; Future      Meds & Refills for this Visit:  Requested Prescriptions      No prescriptions requested or ordered in this encounter       Health Maintenance:  Health Maintenance Due   Topic Date Due    HTN: BP Follow-Up   07/17/2024    COVID-19 Vaccine (4 - 2023-24 season) 09/01/2024    Influenza Vaccine (1) 10/01/2024       Patient/Caregiver Education: Patient/Caregiver Education: There are no barriers to learning. Medical education done.   Outcome: Patient verbalizes understanding. Patient is notified to call with any questions, complications, allergies, or worsening or changing symptoms.  Patient is to call with any side effects or complications from the treatments as a result of today.     Problem List:  Patient Active Problem List   Diagnosis    Tenosynovitis of foot and ankle    Unspecified internal derangement of knee    Ganglion, unspecified    Body mass index 39.0-39.9, adult    Recurrent major depressive disorder, in full remission ([facility])       HOLLI COOL, DO    Please note that portions of this note may have been completed with a voice recognition program. Efforts were made to edit the dictations but occasionally words are mis-transcribed.         [1]   Allergies  Allergen Reactions    Cats Claw, Uncaria Tomentosa SHORTNESS OF BREATH    Ciprofloxacin NAUSEA ONLY    Shellfish Allergy ITCHING

## 2025-01-10 ENCOUNTER — LAB ENCOUNTER (OUTPATIENT)
Dept: LAB | Age: 35
End: 2025-01-10
Attending: FAMILY MEDICINE
Payer: COMMERCIAL

## 2025-01-10 DIAGNOSIS — R79.89 LOW TESTOSTERONE IN MALE: ICD-10-CM

## 2025-01-10 LAB — TESTOST SERPL-MCNC: 172.77 NG/DL

## 2025-01-10 PROCEDURE — 84403 ASSAY OF TOTAL TESTOSTERONE: CPT

## 2025-01-10 PROCEDURE — 36415 COLL VENOUS BLD VENIPUNCTURE: CPT

## 2025-01-13 DIAGNOSIS — R79.89 LOW TESTOSTERONE IN MALE: Primary | ICD-10-CM

## 2025-01-16 ENCOUNTER — OFFICE VISIT (OUTPATIENT)
Dept: INTERNAL MEDICINE CLINIC | Facility: CLINIC | Age: 35
End: 2025-01-16
Payer: COMMERCIAL

## 2025-01-16 VITALS
RESPIRATION RATE: 16 BRPM | HEART RATE: 78 BPM | DIASTOLIC BLOOD PRESSURE: 82 MMHG | SYSTOLIC BLOOD PRESSURE: 136 MMHG | HEIGHT: 72 IN | WEIGHT: 315 LBS | BODY MASS INDEX: 42.66 KG/M2

## 2025-01-16 DIAGNOSIS — Z51.81 ENCOUNTER FOR THERAPEUTIC DRUG MONITORING: Primary | ICD-10-CM

## 2025-01-16 DIAGNOSIS — Z90.49 HISTORY OF CHOLECYSTECTOMY: ICD-10-CM

## 2025-01-16 DIAGNOSIS — E66.01 CLASS 3 SEVERE OBESITY WITH SERIOUS COMORBIDITY AND BODY MASS INDEX (BMI) OF 45.0 TO 49.9 IN ADULT, UNSPECIFIED OBESITY TYPE (HCC): ICD-10-CM

## 2025-01-16 DIAGNOSIS — E88.819 INSULIN RESISTANCE: ICD-10-CM

## 2025-01-16 DIAGNOSIS — G47.33 OSA ON CPAP: ICD-10-CM

## 2025-01-16 DIAGNOSIS — F33.42 RECURRENT MAJOR DEPRESSIVE DISORDER, IN FULL REMISSION (HCC): ICD-10-CM

## 2025-01-16 DIAGNOSIS — E66.813 CLASS 3 SEVERE OBESITY WITH SERIOUS COMORBIDITY AND BODY MASS INDEX (BMI) OF 45.0 TO 49.9 IN ADULT, UNSPECIFIED OBESITY TYPE (HCC): ICD-10-CM

## 2025-01-16 DIAGNOSIS — K21.9 GASTROESOPHAGEAL REFLUX DISEASE, UNSPECIFIED WHETHER ESOPHAGITIS PRESENT: ICD-10-CM

## 2025-01-16 PROCEDURE — 99204 OFFICE O/P NEW MOD 45 MIN: CPT | Performed by: NURSE PRACTITIONER

## 2025-01-16 PROCEDURE — 3079F DIAST BP 80-89 MM HG: CPT | Performed by: NURSE PRACTITIONER

## 2025-01-16 PROCEDURE — 3075F SYST BP GE 130 - 139MM HG: CPT | Performed by: NURSE PRACTITIONER

## 2025-01-16 PROCEDURE — 3008F BODY MASS INDEX DOCD: CPT | Performed by: NURSE PRACTITIONER

## 2025-01-16 RX ORDER — TIRZEPATIDE 2.5 MG/.5ML
2.5 INJECTION, SOLUTION SUBCUTANEOUS WEEKLY
Qty: 2 ML | Refills: 0 | Status: SHIPPED | OUTPATIENT
Start: 2025-01-16

## 2025-01-16 RX ORDER — AZELASTINE 1 MG/ML
2 SPRAY, METERED NASAL 2 TIMES DAILY
COMMUNITY
Start: 2023-06-01

## 2025-01-16 RX ORDER — TIRZEPATIDE 5 MG/.5ML
5 INJECTION, SOLUTION SUBCUTANEOUS WEEKLY
Qty: 2 ML | Refills: 2 | Status: SHIPPED | OUTPATIENT
Start: 2025-01-16

## 2025-01-16 NOTE — PROGRESS NOTES
HISTORY OF PRESENT ILLNESS  Chief Complaint   Patient presents with    Weight Problem     Dr carty referral. No weight loss meds in the past. 1-2 days weekly exercise       Jorge Cristina is a 34 year old male new to our office today for initiation of medical weight loss program, referred by PCP.  Patient presents today with c/o excess weight since middle school.    Reason/goal for weight loss: lose 20-30# in 6 months and lose additional 50-70# to be able to get off CPAP.    Previous weight loss efforts in the past: see below    Past 6 months lifestyle interventions: no    Reviewed [facility] patient contract. Readiness for Lifestyle change: 6/10, Interest in Medication: 10/10, Bariatric surgery interest: 0/10.    Barriers to weight loss: mindless/emotional eating, portions    Wt Readings from Last 6 Encounters:   01/16/25 (!) 361 lb (163.7 kg)   10/14/24 (!) 360 lb 6.4 oz (163.5 kg)   06/17/24 (!) 364 lb (165.1 kg)   10/06/22 (!) 338 lb (153.3 kg)   06/21/19 (!) 346 lb (156.9 kg)   05/20/19 (!) 347 lb (157.4 kg)          Social hx and lifestyle reviewed:    How many meals do you eat out per week: not reported  Who is the primary cook in your home: yes    Please respond to the questions regarding your previous weight loss    How did you hear about the [facility]? Primary Care Provider   Previous weight loss efforts in the past/medication(s): Low-carb diets, weight lifting regimens.   Eating behaviors/patterns that have been barriers to weight loss success in the past: Lack of time for lunch at work, eating more than I likely need when I get home. A sweet tooth and lack of resistance to resist it. Emotional eating. Good stretches of working out that fall apart after periods of illness/holidays/schedule instability.   Please respond to the questions regarding a 24 hour food journal.  Include the average time you ate and the quantity/food preparation method.    List foods, qty and prep for breakfast: TIMBOJ  sandwich on Jermaine's Killer bread, glass of water, handful of chocolate covered almonds,  approximately 04:00   List foods, qty and prep for lunch. none   List foods, qty and prep for dinner. Partner's leftover hamburger, chocolate covered almonds. Approximately 1400.   List foods, qty and prep for snacks. [place] chocolate nut bar. Approx. 10:00   List the types and qty of fluids consumed Bring water bottle with 1/2 gallon, gatorade and zip-fizz mixed in for work. Large iced latte from [employer] @0500   Please respond to the questions regarding lifestyle.    Tobacco use: No   Alcohol use: How many servings per week? 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   Perceived level of exertion on a scale of 1-5, with 5 being very intense: 2   Average stress level on a scale of 1-10, with 10 being extremely stressed: 4   How do you cope with stress: Video games, sweets   Please respond to the questions regarding sleep    How many hours of uninterrupted sleep do you get a night: 8   How many times do you wake up in the night: 1   Do you feel rested in the morning: No   Do you snore: Yes   Do you have sleep apnea: Yes   Do you use: CPAP     Work: pharmacist- rotating schedule  Marital status: Partner  Support: yes    MEDICAL HISTORY  PMH reviewed:   Cardiac disorders: negative  Depression/anxiety: Depression- controlled, seeing psychiatry  Glaucoma: negative  Kidney stones: negative  Eating disorder: negative  Migraines: negative  Seizures: negative  Joint-related conditions: negative  Liver disease: negative  Renal disease: negative  Diabetes: negative  Thyroid disease: negative  Constipation: negative  Other pertinent hx:  GERD  Sleep Apnea hx: ANALISA on CPAP  Cancer hx: testicular cancer  Cholecystectomy and/or gallstones: negative  Family or personal history of Pancreatic issues / Medullary Thyroid Cancer/MENS 2: negative  History of bariatric surgery: negative    FMH reviewed: obesity in parent/s or sibling: yes    REVIEW OF SYSTEMS  GENERAL: feels well otherwise  SKIN: denies any rashes to skin folds  HEENT: snoring- yes  LUNGS: denies shortness of breath with exertion  CARDIOVASCULAR: denies chest pain on exertion, denies palpitations or pedal edema  GI: denies abdominal pain.  No N/V/D/C  MUSCULOSKELETAL: denies joint pains  NEURO: denies headaches  PSYCH: denies change in behavior or mood, denies feeling sad or depressed.    EXAM  /82   Pulse 78   Resp 16   Ht 6' (1.829 m)   Wt (!) 361 lb (163.7 kg)   BMI 48.96 kg/m² ,   Percent body fat: M >25%: 41.4%. VF: 33. Muscle Mass: 18.7%, WC: 58 inches.  GENERAL: well developed, well nourished, in no apparent distress, morbidly obese  SKIN: warm, pink, dry without rashes to exposed area  EYES: conjunctiva pink, sclera non icteric, PERRLA  HEENT: atraumatic, normocephalic  NECK: supple, non tender, no adenopathy, no thyromegaly  LUNGS: CTA in all fields, breathing non labored  CARDIO: RRR without murmur, normal S1 and S2 without clicks or gallops, no pedal edema.  GI: +BS, soft, no masses, HSM or tenderness  MUSCULOSKELETAL: grossly intact  NEURO: Oriented times three  PSYCH: pleasant, cooperative, normal mood and affect    Lab Results   Component Value Date    WBC 10.0 06/17/2024    RBC 5.16 06/17/2024    HGB 15.4 06/17/2024    HCT 45.7 06/17/2024    MCV 88.6 06/17/2024    MCH 29.8 06/17/2024    MCHC 33.7 06/17/2024    RDW 13.3 06/17/2024    .0 06/17/2024     Lab Results   Component Value Date    GLU 99 06/17/2024    BUN 14 06/17/2024    BUNCREA 13.1 05/20/2019    CREATSERUM 1.18 06/17/2024    ANIONGAP 7 06/17/2024    GFRNAA 73 05/20/2019    GFRAA 84 05/20/2019    CA 9.3  06/17/2024    OSMOCALC 289 06/17/2024    ALKPHO 106 06/17/2024    AST 19 06/17/2024    ALT 35 06/17/2024    BILT 0.9 06/17/2024    TP 8.0 06/17/2024    ALB 4.0 06/17/2024    GLOBULIN 4.0 06/17/2024     06/17/2024    K 4.2 06/17/2024     06/17/2024    CO2 26.0 06/17/2024     No results found for: \"EAG\", \"A1C\"  Lab Results   Component Value Date    CHOLEST 166 06/17/2024    TRIG 130 06/17/2024    HDL 40 06/17/2024     (H) 06/17/2024    VLDL 22 06/17/2024    NONHDLC 126 06/17/2024     Lab Results   Component Value Date    TSH 1.540 06/17/2024     No results found for: \"B12\", \"VITB12\"  No results found for: \"VITD\", \"QVITD\", \"DTOD43ZH\"    Medications Ordered Prior to Encounter[1]    ASSESSMENT  Initial Weight Data and Goal Weight Loss:  Weight Calculations  Initial Weight: 361 lbs  Initial Weight Date: 01/16/25  Today's Weight: 361 lbs  5% Goal: 18.05  10% Goal: 36.1  Total Weight Loss: 0 lbs    Diagnoses and all orders for this visit:    Encounter for therapeutic drug monitoring  -     Vitamin D; Future  -     Vitamin B12; Future  -     Hemoglobin A1C; Future  -     Tirzepatide-Weight Management (ZEPBOUND) 2.5 MG/0.5ML Subcutaneous Solution Auto-injector; Inject 2.5 mg into the skin once a week.  -     Tirzepatide-Weight Management (ZEPBOUND) 5 MG/0.5ML Subcutaneous Solution Auto-injector; Inject 5 mg into the skin once a week. Start after completing full 4 weeks on 2.5 mg weekly dose.    Class 3 severe obesity with serious comorbidity and body mass index (BMI) of 45.0 to 49.9 in adult, unspecified obesity type (HCC)  - Start Zepbound  - Reviewed balanced plate nutrition with focus on whole food, regular meals daily that include protein and produce and eliminating/reducing late night eating.  - Counseled on the 4 Pillars of health (sleep, stress, nutrition and fitness).  - Reviewed weight synopsis in EMR  Comments:  Baseline BMI 48.95  Orders:  -     Vitamin D; Future  -     Vitamin B12; Future  -      Hemoglobin A1C; Future  -     OP REFERRAL TO [facility] ([facility] USE ONLY)  -     Tirzepatide-Weight Management (ZEPBOUND) 2.5 MG/0.5ML Subcutaneous Solution Auto-injector; Inject 2.5 mg into the skin once a week.  -     Tirzepatide-Weight Management (ZEPBOUND) 5 MG/0.5ML Subcutaneous Solution Auto-injector; Inject 5 mg into the skin once a week. Start after completing full 4 weeks on 2.5 mg weekly dose.  -     [facility]    ANALISA on CPAP  - Start Zepbound  Comments:  AHI 25  Orders:  -     Vitamin D; Future  -     Vitamin B12; Future  -     Hemoglobin A1C; Future  -     OP REFERRAL TO [facility] ([facility] USE ONLY)  -     Tirzepatide-Weight Management (ZEPBOUND) 2.5 MG/0.5ML Subcutaneous Solution Auto-injector; Inject 2.5 mg into the skin once a week.  -     Tirzepatide-Weight Management (ZEPBOUND) 5 MG/0.5ML Subcutaneous Solution Auto-injector; Inject 5 mg into the skin once a week. Start after completing full 4 weeks on 2.5 mg weekly dose.  -     OP REFERRAL TO [facility]    Insulin resistance  Comments:  Trig/HDL ratio 3.25, optimal goal <1.6  Orders:  -     Vitamin D; Future  -     Vitamin B12; Future  -     Hemoglobin A1C; Future  -     OP REFERRAL TO [facility] ([facility] USE ONLY)  -     Tirzepatide-Weight Management (ZEPBOUND) 2.5 MG/0.5ML Subcutaneous Solution Auto-injector; Inject 2.5 mg into the skin once a week.  -     Tirzepatide-Weight Management (ZEPBOUND) 5 MG/0.5ML Subcutaneous Solution Auto-injector; Inject 5 mg into the skin once a week. Start after completing full 4 weeks on 2.5 mg weekly dose.  -     OP REFERRAL TO [facility]    History of cholecystectomy  -     Vitamin D; Future  -     Vitamin B12; Future  -     Hemoglobin A1C; Future  -     OP REFERRAL TO [facility] ([facility] USE ONLY)    Gastroesophageal reflux disease, unspecified whether esophagitis present  -     OP REFERRAL TO [facility]    Recurrent major depressive disorder, in full remission (HCC)  -     OP REFERRAL TO  [facility]        PLAN  Medication use and side effects reviewed with patient.  Medication contraindications: Bupropion XL/Contrave with concurrent meds.  Follow up with dietitian and psychologist as recommended.  Discussed the role of sleep and stress in weight management.  Labs orders as above.  Counseled on comprehensive weight loss plan including attention to nutrition, exercise and behavior/stress management for success. See patient instruction below for more details.  Reviewed previous labs in EMR/Care Everywhere  Weight Loss Contract reviewed and signed.    Patient Instructions   Welcome to the [facility] Program...your Lifestyle Renovation begins now!  Thank you for placing your trust in our health care team, I look forward to working with you along this journey to better health!    Next steps:     1.  Call our office at [phone] to schedule a personal nutrition consultation with one of our registered dieticians, Flash or Nikki. Bring along your food journal (3 days minimum). See journal options below.  2.  Complete non fasting (10-12 hours, water only) labs at [facility] lab site prior to next office visit. Lab results will be communicated via [employer].  3.  Counseling referral placed for additional support in mindful eating practices.  4.  Body composition completed today with findings of: Total body fat: 41.4% (goal <25%), Visceral Fat: 33 (goal <10), Muscle mass: 18.7% (goal > 21%), and waist circumference 58 inches (goal < 40 inches).  5.  Fill your prescribed medication and take as discussed and prescribed: Start Zepbound at 2.5 mg weekly. After 4 weeks increase to the next dose of 5 mg weekly. If <5# weight loss on 5 mg weekly dose after 30 days, then send [employer] message with current scale weight to determine if a dose adjustment is appropriate. Otherwise plan to maintain this dose until next visit. Any further dose titrations beyond this dose will be considered at  appointments only, unless otherwise discussed. Visit the website [email or web address] and click on Consumers for additional details, savings, and further dosing instructions. This medication may require a prior authorization (PA) by your insurance. A PA may take one week plus to complete and our office will be in touch during this process if needed. If cost/supply prohibitive plan: contact office.    Zepbound is being prescribed for the dual FDA indication of treating Overweight/Obesity and Sleep Apnea    Tips while taking an injectable medication:    Be an intuitive eater. Listen to your hunger and fullness signals, stopping when you are full.  Consume protein and produce in your day, striving for a rainbow of color of produce.  Reduce portions to starting size of 1 cup and check in with your gut to see if you are full. Use a sand timer to slow down your eating pace to allow for 15-20 minutes to complete a meal and use the \"2 bite rule\".  Reduce refined sugars and high fat foods, as they may contribute to greater side effects of nausea and heartburn.  Stop eating 3 hours before bedtime to allow your food to digest.  Remain hydrated with water or non caloric and non caffeine beverages.  Use over the counter emily lozenge/supplement to help reduce nausea if needed.  If you have been off your medication for more than 2 weeks please notify our office to determine next dosing, as a return to previous dose may not be appropriate or tolerated.      Please try to work on the following dietary changes this first month:    1.  Drink water with meals and throughout the day, cut down on soda and/or juice if consumed. Consider flavored water options like Bubbly, Spindrift, Hint and Marleny.  2.  Have protein with each meal, examples include: greek yogurt, cottage cheese, hard boiled egg, tofu, chicken, fish, or tuna.  3.  Work towards reducing/eliminating refined carbohydrates and sugars which includes items such as sweets,  as well as rice, pasta, and bread and make sure to choose whole grain options when having them with just 1 serving per meal about the size of your inner palm.  4.  Consume non starchy veggies daily working towards making them a good 50% of your daily food intake. Add them to lunch and dinner consistently.  5.  Start a daily probiotic: VSL#3 is recommended, (order on line at www.vsl3.com). Take 1 capsule daily with water for 30 days, then reduce to 1 every other day (this will reduce the cost). Capsules can be left out of refrigerator for 2 weeks. I recommend using a pill box weekly and keeping the bottle in the fridge.    Please download consuelo My Fitness Pal, LoseIt! Or Net Diary to monitor daily dietary intake and you will be able to see if you are eating the right amount of calories or too much or too little which would hinder weight loss. Additionally this will help to see your daily carbohydrate and protein intake. When you set the consuelo up choose 1-1.5 lbs/week as a goal.  Keeping a paper food journal is an option as well to remain accountable for your choices- this is the start to mindful eating! A low calorie diet has been consistently shown to support weight loss.    Continue or start exercising to help establish a routine. If not already exercising begin with 1 day/week and progress as able with the goal of working towards 30 minutes 5 days a week at a minimum. A variety or cardio, strength and stretching is important. Review resources below to help support you in building this healthy routine.    Meditation daily can help manage and control stress. Chronic stress can make weight loss difficult.  Exercising is one way to help with stress, but meditation using the CALM Consuelo or another comparable alternative can be done in your home or place of work with little time commitment. This Consuelo can also help work on behavior change and improve sleep. Check out the segment under Calm Masterclass and listen to The 4 Pillars  of Health. A great way to begin learning about the foundation of lifestyle with practical tips to use in your every day. In addition, we offer counseling services and support for individual connection and care. A referral is necessary so please let me know if this is a service you are interested.    Check out www.yourweightmatters.org blog for continued support and education along your weight loss journey to optimal health!      Patient Resources:    Personal Training/Fitness Classes/Health Coaching    [facility] in [place]: Full fitness center with group fitness and personal training located in [place].  Health Coaching with Zahra Swain, Ranulfo Cruz, and Marvin Ram at our [facility]- individual coaching to work on your health goals. Call [phone] and/or email @ [email or web address]. Free 60 minute consult when client of [facility].  [facility] [place] @ http://www.eBuilder. A variety of group fitness options plus various yoga classes [phone] and/or email Rebekah at [email or web address]  [facility] Fitness Centers with multiple locations: Forsitec (www.Point Inside), F45 Training (www.m29cztvqmlqLookout), Pulmocide Body Bootcamp (www.Motobuykers.Respira Therapeutics), NetDocuments (www.Lookingglass Cyber Solutions), The Exercise  (www.exercisecoach.com), Club Pilates (www.clubpilates.com)    Online Fitness  Fitness  on Utube  Fit in 10 DVD series   www.aqiyc95GWE.Respira Therapeutics  Chair exercises via Sit and Be Fit (www.sitandbefit.org) and UsingMiles (www.SharedBy.co.com) or Herminio Forde or Harris Hurst videos on YouTube.  Hip Hop Fit with Timi Bowles at www.hiphopfit.net    Apps for on the Go Fitness  [place] 7 Minute Workout (orange box with white 7) - free on the go HIIT training consuelo  Flaco Consuelo @ www.onepeloton.com    Nutrition Trackers, Meal Preparation, and Other Meal Programs  LoseIT! And My Fitness Pal apps and on line  for tracking nutrition  NOOM - virtual health coaching  FitFoundation (healthy meals on the go) in [place] @ www.lfvmoobjoffwk2t.com  Casey HE @ www.bistrWHATTd.AccuVein and Aaviwp61 (calorie smart and low carb plans recommended) @ www.shzska44.com, Metabolic Meals @ www.MyMetabolicMeals.com - individual prepared meals to go  Gobble, Blue Apron, Home , Every Plate, Sunbasket- on line meal delivery programs for preparation at home  Meal Village in [place] for homemade meals to go @ www.mealvillage.AccuVein  Diet Doctor @ www.dietdoctor.com - low carb swaps  ReciMe and Mealime devon (grocery and meal planning)    Stress, Anxiety, Depression, Trauma  CALM meditation devon (www.calm.com)  Headspace  Don't let anxiety run your life. Using the science of emotion regulation and mindfulness to overcome fear and worry by Gerardo Fields PsyD and Herbie Rosado MA.  The Telderi Podcast (September 27, 2023): 6 Magic Words That Stop Anxiety  What Happened to You?- a look at the impact trauma has on behavior written by Duong Dodge and Dr. Kamar Laguna  Whole Again by Shravan Mcdonald - discovering your true self after trauma    Mindful Eating/The Hungry Brain  Am I Hungry? Mindful eating virtual  devon (www.amihungry.com)  The Hungry Brain by Ignacia Lorenzo, PhD  Mindless Eating by Andi Avendaño  Weight Loss Surgery Will Not Treat Food Addiction by Pretty Hansen Ph.D    Metabolic Dysfunction, Hormones and Cravings  Why We Get Sick? By Sarthak Whitaker (insulin resistance)  Your Body in Balance: The New Science of Food, Hormones, and Health by Dr. Nba Rivera  The Complete Guide to fasting by Dr. Noriega  Fast Like a Girl by Dr. Beverley Hobson  The M Factor (documentary on PBS about Menopause)  Sugar, Salt & Fat by Anupama Fuentes, Ph.D, R.D.  The Truth About Sugar - documentary on sugar (Free on [employer], https://youtu.be/0O3hxlrIM9m)  Presentation on SUGAR called Sugar: The Bitter Truth by Dr. Jorge Bojorquez ([facility])  https://youtu.be/dBnniua6-oM?si=[email or web address]  Reverse Visceral Fat: #1 Way to Increase Your Lifespan & End Inflammation with Dr. Michael Lowe on Utube @ https://youproVITALu.be/nupPRnvUpJY?si=[email or web address]    Nutrition Support  You Are What You Eat - Netfix series on twin study looking at impact of nutrition changes on health  The End of Dieting: How to Live for Life by Dr. Humberto Sol M.D. or listen to The RVR Systems Podcast Episode 63: Understanding \"Nutritarian\" Eating w/Dr. Humberto Sol  The Game Changers- Netflix Documentary on plant based nutrition  The Dr. Anglin T5 Wellness Plan by Dr. Kirby Anglin MD  The Complete Guide to fasting by Dr. Noriega  @[facility] (Instagram Dietician with support surrounding nutrition and meal prep/planning)    Education, Motivation and Support Resources  Live to 100: Secrets of the Blue Zones - Netflix series on the secrets to communities living over 100 years old  Atomic Habits by Kelechi Fajardo (a book about taking small steps to promote greater behavior change)   Motivation devon (black box with white \")- daily supportive messages sent to your phone  Can't Hurt Me by Gerardo Tracey (a book exploring the power of discipline in achieving your goals)  Fed Up - documentary about obesity (Free on Utube)  Www.yourweightmatters.org - Obesity Action Coalition sponsored Blog posts  Obesity Action Coalition Resources on topics specific to weight management (www.obesityaction.org)  Fitlosophy Fitspiration - journal to better health (journal book found at [employer] in fitness aisle)  Kiana Delatorre talk titled: The Call to Courage (Netflix)  The Exam Room by the Physician's Committee (Podcast)  Nutrition Facts by Dr. Muniz (Podcast)      Set your food environment up for success with tips listed below to help support your health journey and provide less temptations!  How to Create a Healthy Food Environment in Your Home  December 28, 2020  Posted in Blog, Nutrition  By Your Weight Matters  Campaign    Why create a healthy food environment in your home? Every day, decisions we make about food are influenced by hundreds of factors, many of them subconscious. For example, an open box of cookies on the counter is more tempting than an apple in your crisper.  Having a healthy food environment at home will make it easier to choose healthy options and stay on track with habits that support your health goals.  Here are a few steps you can take to set up your home for success.  How to Build a Healthy Food Environment  1 - Clear Your Home of Less-healthy Food  These foods are okay in moderation, but they can be enjoyed outside of the house in a fun and empowering way. Go through your kitchen fridge, freezer, pantry, cabinets and counters to toss out foods that aren't helping you meet your health goals.  2 - Put Healthy Foods in Plain Sight  Keep grab-n-go fruit on the counter instead of snack foods so they are easily accessible. Consider moving your refrigerated fruit to a shelf at eye-level instead of keeping it in the crisper. Vice versa, move less healthy foods to the crisper so they aren't always in your line of sight.  3 - Find Healthy Substitutions  Feeling deprived of your favorite foods can make you miserable and resentful. Instead, experiment with healthier alternatives. Here are some examples:  Calorie-controlled frozen yogurt bars like Yasso vs high-calorie ice cream novelties  Sparkling water like Bubbly vs soda or fruit juice  Skinny popcorn vs chips and crackers  Dessert hummus vs pudding cups or ice cream  4 - Clean and Organize  A messy kitchen can make it hard to find healthy choices when you are hungry or short on time. It also makes it hard to locate whether or not you need something from the grocery store. Give your kitchen a new year makeover and organize your food storage areas so you can always locate what you need without distraction.  5 - Eat at Your Kitchen Table  Eating at your  kitchen table without distractions is a way to eat more mindfully and enjoy time with your family. Eating on the couch while watching shows or movies can encourage mindless eating and consuming more calories than intended.  6 - Keep Healthy Foods Readily Available  When you are hungry and in a pinch, it's important to have healthy foods on-hand so you don't make hunger-controlled choices. This means keeping ready-to-go-snack foods in your kitchen such as fruit paired with nut butter, hummus and carrots, lunch meat and cheese roll-ups, etc. It may also help to keep pre-made freezer meals on-hand (something you've cooked in advance) for busy weeknights when you don't have time to prepare food.  Turning your home into a healthy food environment can set you up for success and confident decision-making. Plus, it feels good to be organized and empowered!    Mindful Eating Tips:  When we sit down to a meal by ourselves or with friends, it's easy to zone out and disconnect from our bodies. But, if you approach eating a meal with the intent to stay mindful and present, you will be able to enjoy yourself and walk away from the table feeling good about yourself and your choices. Here are several tips to keep in mind when it comes to food and eating.    Choose for yourself: Do not get hung up on what other people are eating. Instead, ask yourself what you would like to eat.    Forget about good and bad: Remind yourself that foods fall on a nutritional continuum (high value/low value), not on a moral continuum (good/bad).    Stay clear of guilt or shame: Refrain from allowing guilt or shame to contaminate your eating decisions. Avoid secret eating and get clear on who you are really hiding from if you eat in secret.    Choose foods that you like: Do not eat foods that you do not find satisfying or enjoyable. Eating that way will make you feel like you are on a diet.    Look before you eat: Before you eat, look at your food, its  portion size, and presentation. Breathe deeply. Look again before taking a mouthful.    Chew every mouthful: Chewing a lot helps to thoroughly release the flavor of foods. Let food sit on your tongue. This allows your taste buds to absorb the flavor and transmit messages about your appetite to your brain.    Talk or eat: When you are talking, stop eating. When you are eating, stop talking.    Stay connected: Pay attention to your body's appetite signals while you are eating.    Pause while you are eating: Think about how you are feeling about your food in terms of quality and quantity.    Know when to stop eating: Stop eating when flavor intensity declines, as it is bound to do. Do not try to polish off all of the food in front of you. Instead, aim for the moment when flavor peaks and you feel an internal “ah” of satisfaction--then stop.    Evaluate how full you are: Keep asking yourself while you are eating, “Am I still hungry?” and “Am I satisfied?”    by Zahra Swain- [facility]  and     Sugar - It’s Not as Sweet as You Think    by Vicky Jerome, RN, BSN, CBN  OAC Summer 2014 @ https://www.obesityaction.org/resources/sugar-its-not-[email or web address]-sweet-as-you-think/    According to the [place] Department of Agriculture (USDA), dietary trends from 0485-5841, sugar consumption increased by 19 percent since 1970. Today, the average American consumes 20 teaspoons or 100 pounds of sugar each year! That amounts to 300 calories a day from sugar alone. And to make matters worse (you will read why later in this article), corn syrup consumption is on the rise, increasing by 387 percent in the same period of time. The largest amount of sugar is not being consumed in ingested fruits or other natural sugars. The largest amounts of consumed sugars are in the form of High Fructose Corn Syrup (HFCS) and brown sugar. Neither occurs naturally, and both are highly processed and in nearly every processed package food  you will find in your local grocery store. Many of these foods you most likely would not suspect having sugar as an additive (see quiz at bottom of page).    What is sugar?  Sugar is a simple carbohydrate, which can either be a monosaccharide or disaccharide. Monosaccharides include glucose, fructose, and galactose. These three monosaccharides can join together to make the disaccharides maltose, sucrose, and lactose. These compounds are found in the foods we eat and are collectively called “sugar.”    The following are types of sugar and their natural source:    Most people associate the term “sugar” with the white sugar we put in coffee or iced tea. The human body uses glucose, the simplest unit of carbohydrate, as its primary fuel. Without adequate carbohydrate intake, our bodies will obtain glucose, or fuel, from another source. The possibilities include a breakdown of proteins we eat or proteins stored in our body, which may ultimately lead to muscle loss and affecting one’s metabolic rate or even malnutrition. However, our need is far below the current daily consumption.    Is sugar addictive? You be the .  When high doses of sugar are consumed, it stimulates the release of dopamine in our brains. This response makes us feel pleasure (now you know why when you feel down or depressed you may want to overindulge in sweets). The drug morphine, cocaine and sugar all stimulate the same brain receptors. This study has been proven many times in lab rat studies.    In his new and fascinating book, Salt Sugar Fat: How the Food Giants Hooked Us, Pulitzer Prize-winning  Arnaldo Altamirano (I highly recommend this book) goes inside the world of processed and packaged foods. Adarsh writes in detail about how the food industry (which is 17 percent of our economy) contributes to American’s obesity epidemic by infusing processed foods with sugar, salt, and fat to make it more addictive and pleasurable. You see now why  so many continue to buy their products?    According to the [facility] Policy and Obesity, the average child sees 5,500 food commercials a year that advertise high sugar breakfast cereals, fast food, soft drinks, candy and snacks. According to the Federal Trade Commission, the food industry spends $1.6 billion annually to reach children through the media, including the Internet.    What is high fructose corn syrup?  High fructose corn syrup (HFCS) is an industrial food product and not “natural” or a naturally occurring substance. It is extracted from corn stalks through a secret process. The sugars are extracted through a chemical enzymatic process resulting in HFCS.    Regular cane sugar (sucrose) is made of two-sugar molecules bound tightly together - glucose and fructose in equal amounts. The enzymes in your digestive tract must break down the sucrose into glucose and fructose, which are then absorbed into the body.    HFCS also consists of glucose and fructose, not in a 50-50 ratio, but a 55-45 fructose to glucose ratio in an unbound form. Fructose is sweeter than glucose. And HCFS is cheaper than sugar. One of the reasons is because of the government farm bill corn subsidies. Products with HFCS are much sweeter and much cheaper than products made with cane sugar.    Sugar and HFCS’ Effect on the Body  Eating sugar has a systemic effect on your entire body including increased risk for diabetes, increased appetite, weight gain, heart and liver problems, decreased immune system, certain cancers and even your brain function to name a few.    Type 2 Diabetes  Type 1 Diabetes is when one’s pancreas does not make insulin. Type 2 Diabetes is when one’s body does not utilize insulin effectively. Type 1 Diabetes is usually diagnosed at a young age. Type 2 Diabetes used to occur in adulthood and was called “Adult Onset Diabetes,” however; it has since been renamed to Type 2 Diabetes because the onset is  commonly seen at a much earlier age as the obesity epidemic increases. It has been estimated that just fewer than 2,000,000 individuals were diagnosed with Type 2 diabetes in 2010.    The pancreas acts on ingested sugar by secreting insulin. Insulin is a hormone that regulates the amount of sugar in the blood. If blood sugar gets too high or too low, it could be life-threatening. An increased amount of sugar in one’s diet causes the pancreas to secrete insulin. In some individuals, this leads to an overload on the pancreas and the development of Type 2 diabetes.    Sugar, Appetite & Weight Gain  Eating less sugar is linked with weight-loss, and eating more is linked with weight gain, according to a new review of published studies. The review lends support to the idea that advising people to limit the sugar in their diets may help lessen excess weight and obesity, the researchers conclude. “The really interesting finding is that increasing and decreasing sugar had virtually identical results (on weight), in the opposite direction of course,” says researcher MOUNIKA Isidro, PhD, professor of human nutrition and medicine at the [facility].    According to leading nutritional expert, Leandro Fuentes MD, PhD, MPH, chair of nutrition at [employer] and author of Eat, Drink and Be Healthy, “Sugar increases body weight mainly by encouraging overeating.”    Heart and Liver Damage  A study published in the journal Hepatology in late 2012 found that consumption of fructose appears to affect the availability of the energy-transferring chemical ATP in the liver, thereby increasing the risk of liver cell malfunction and death.    In another review of HFCS, The American Journal of Clinical Nutrition, Renzo Arredondo, PhD, Department of Nutrition, [facility] explains that HFCS is absorbed more rapidly than regular sugar, and that it doesn’t stimulate insulin or  leptin production. This prevents you from triggering the body’s signals for being full and may lead to overconsumption of total calories.    A 2012 paper in the journal Nature, brought forward the idea that limitations and warnings should be placed on sugar similar to warnings we see on alcohol. The authors showed evidence that fructose and glucose in excess can have a toxic effect on the liver as the metabolism of ethanol (the alcohol contained in alcoholic beverages) had similarities to the metabolic pathways of fructose.    Another published study in the Journal of Nutrition in 2012, found that children who consumed high levels of fructose had lower blood levels of cardiovascular protective compounds, such as HDL cholesterol and adiponectin. Higher consumption of fructose led to higher levels of fat around the midsection, a significant risk factor for diabetes and cardiovascular disease.    Immune System  Eliminating all sugar from a cancer patient’s diet would harm healthy cells that need energy to function. For example, many fruits contain high levels of antioxidants which are known to be effective in fighting cancer; however, sugars that come from whole fruits are low in sugar. Plant-based nutrition is a benefit to our overall health including fighting or preventing cancers. These important antioxidants, phytochemicals, fiber, vitamins and minerals are found in these plant-based whole foods.    However, diets high in sugar and refined carbohydrates can lead to overweight and obesity, which indirectly increases cancer risk throughout time. Certain cancers including breast, prostate, colorectal and pancreatic are associated with obesity.    How can you avoid these unhealthy consequences of (often hidden) sugar?  The best way to avoid sugar is to not consume obvious foods that are loaded with sugar. However, as discussed in this article, there are many packaged foods that surprisingly have added sugar and the  more health-damaging high fructose corn syrup.  Therefore…    Eat nature’s foods  Avoid processed food (I call these factory foods, not real foods.)  Don’t eat foods in packages (or dramatically decrease consumption)  Eat foods that rot  Eat foods that walked the earth, flew in the marah, swam in the ocean or grew in the soil     Beware!  Typically, the first ingredient on a label is the most prevalent in the food product; however, beware because there may be small amounts of many types of sugars, so none of them end up being in the first few ingredients (top 3) of the label. Sugar is disguised as a “healthy” ingredient, such as honey, rice syrup, or even “organic dehydrated cane juice.”    Here is a list of some of the possible “sugar” code words:    Corn sweetener  Corn syrup, or corn syrup solids  Dehydrated Cane Juice  Dextrin  Dextrose  Fructose  Fruit juice concentrate  Glucose  High-fructose corn syrup  Honey  Invert sugar  Lactose  Maltodextrin  Malt syrup  Maltose  Maple syrup  Molasses  Raw sugar  Rice syrup  Saccharose  Sorghum or sorghum syrup  Sucrose  Syrup  Treacle  Turbinado sugar  Xylose    High Fructose Corn Sugar (HFCS) Quiz:    Which of the below listed foods contain High Fructose Corn Syrup?    Kraft Macaroni and Cheese  Stove Top Stuffing - Home style Herb  Kimmie Sun Iced Tea  Ocean Spray Cranberry Juice  Wild Cherry Lifesavers  Robitussan Cough and Congestion  Wonderbread - White Bread  Smuckers Grape Jelly  Escalante’s Vegetable Soup  Mr & Mrs T Bloody Stacy Mix  Nabisco Fig Newtons - Whole Grain  Nabisco Fig Newtons - Fat Free  Wishbone Classic Caesars’ Dressing  Chicken of the Sea White Tuna, [place]    Answer: All    Additional Resources:    The Truth About Sugar - documentary on sugar free on [employer] @ https://youtu.be/7M2nvbpQF6m  SUGAR: The Bitter Truth by Dr. Jorge Bojorquez (pediatric endocrinologist) - Lecture on sugar for free on  U[employer] @  [email or web address]    Mindful Eating Tips:  When we sit down to a meal by ourselves or with friends, it's easy to zone out and disconnect from our bodies. But, if you approach eating a meal with the intent to stay mindful and present, you will be able to enjoy yourself and walk away from the table feeling good about yourself and your choices. Here are several tips to keep in mind when it comes to food and eating.    Choose for yourself: Do not get hung up on what other people are eating. Instead, ask yourself what you would like to eat.    Forget about good and bad: Remind yourself that foods fall on a nutritional continuum (high value/low value), not on a moral continuum (good/bad).    Stay clear of guilt or shame: Refrain from allowing guilt or shame to contaminate your eating decisions. Avoid secret eating and get clear on who you are really hiding from if you eat in secret.    Choose foods that you like: Do not eat foods that you do not find satisfying or enjoyable. Eating that way will make you feel like you are on a diet.    Look before you eat: Before you eat, look at your food, its portion size, and presentation. Breathe deeply. Look again before taking a mouthful.    Chew every mouthful: Chewing a lot helps to thoroughly release the flavor of foods. Let food sit on your tongue. This allows your taste buds to absorb the flavor and transmit messages about your appetite to your brain.    Talk or eat: When you are talking, stop eating. When you are eating, stop talking.    Stay connected: Pay attention to your body's appetite signals while you are eating.    Pause while you are eating: Think about how you are feeling about your food in terms of quality and quantity.    Know when to stop eating: Stop eating when flavor intensity declines, as it is bound to do. Do not try to polish off all of the food in front of you. Instead, aim for the moment when flavor peaks and you feel an  internal “ah” of satisfaction--then stop.    Evaluate how full you are: Keep asking yourself while you are eating, “Am I still hungry?” and “Am I satisfied?”    by Zahra Encarnacion [facility]  and         Return in about 4 months (around 5/16/2025) for weight management via Telemedicine Visit and in Clinic in August.    Patient verbalizes understanding.    Gracia Yi, APRN  1/15/2025    DOCUMENTATION OF TIME SPENT: Code selection for this visit was based on time spent : 55 minutes on date of service in preparing to see the patient, obtaining and/or reviewing separately obtained history, performing a medically appropriate examination, counseling and educating the patient/family/caregiver, ordering medications or testing, referring and communicating with other healthcare providers, documenting clinical information in the electronic medical record, independently interpreting results and communicating results to the patient/family/caregiver and care coordination with the patient's other providers.         [1]   Current Outpatient Medications on File Prior to Visit   Medication Sig Dispense Refill    azelastine 0.1 % Nasal Solution 2 sprays by Nasal route 2 (two) times daily.      Azelastine-Fluticasone 137-50 MCG/ACT Nasal Suspension 2 sprays by Nasal route daily.      EPINEPHrine 0.3 MG/0.3ML Injection Solution Auto-injector Inject 0.3 mL (1 each total) into the muscle as needed.      fexofenadine 180 MG Oral Tab Take 1 tablet (180 mg total) by mouth daily.      PSYLLIUM HUSK OR       Misc Natural Products (BEET ROOT OR)       MAGNESIUM GLYCINATE OR       Amylase-Lipase-Protease (DIGESTIVE ENZYMES OR)       venlafaxine ER 75 MG Oral Capsule SR 24 Hr Take 3 capsules (225 mg total) by mouth daily.       No current facility-administered medications on file prior to visit.

## 2025-01-16 NOTE — PATIENT INSTRUCTIONS
Welcome to the [facility]...your Lifestyle Renovation begins now!  Thank you for placing your trust in our health care team, I look forward to working with you along this journey to better health!    Next steps:     1.  Call our office at [phone] to schedule a personal nutrition consultation with one of our registered dieticians, Flash Jordan. Bring along your food journal (3 days minimum). See journal options below.  2.  Complete non fasting (10-12 hours, water only) labs at [facility] lab site prior to next office visit. Lab results will be communicated via LiveHealthier.  3.  Counseling referral placed for additional support in mindful eating practices.  4.  Body composition completed today with findings of: Total body fat: 41.4% (goal <25%), Visceral Fat: 33 (goal <10), Muscle mass: 18.7% (goal > 21%), and waist circumference 58 inches (goal < 40 inches).  5.  Fill your prescribed medication and take as discussed and prescribed: Start Zepbound at 2.5 mg weekly. After 4 weeks increase to the next dose of 5 mg weekly. If <5# weight loss on 5 mg weekly dose after 30 days, then send LiveHealthier message with current scale weight to determine if a dose adjustment is appropriate. Otherwise plan to maintain this dose until next visit. Any further dose titrations beyond this dose will be considered at appointments only, unless otherwise discussed. Visit the website [email or web address] and click on Consumers for additional details, savings, and further dosing instructions. This medication may require a prior authorization (PA) by your insurance. A PA may take one week plus to complete and our office will be in touch during this process if needed. If cost/supply prohibitive plan: contact office.    Zepbound is being prescribed for the dual FDA indication of treating Overweight/Obesity and Sleep Apnea    Tips while taking an injectable medication:    Be an intuitive eater. Listen to your  hunger and fullness signals, stopping when you are full.  Consume protein and produce in your day, striving for a rainbow of color of produce.  Reduce portions to starting size of 1 cup and check in with your gut to see if you are full. Use a sand timer to slow down your eating pace to allow for 15-20 minutes to complete a meal and use the \"2 bite rule\".  Reduce refined sugars and high fat foods, as they may contribute to greater side effects of nausea and heartburn.  Stop eating 3 hours before bedtime to allow your food to digest.  Remain hydrated with water or non caloric and non caffeine beverages.  Use over the counter emily lozenge/supplement to help reduce nausea if needed.  If you have been off your medication for more than 2 weeks please notify our office to determine next dosing, as a return to previous dose may not be appropriate or tolerated.      Please try to work on the following dietary changes this first month:    1.  Drink water with meals and throughout the day, cut down on soda and/or juice if consumed. Consider flavored water options like Bubbly, Spindrift, Hint and Marleny.  2.  Have protein with each meal, examples include: greek yogurt, cottage cheese, hard boiled egg, tofu, chicken, fish, or tuna.  3.  Work towards reducing/eliminating refined carbohydrates and sugars which includes items such as sweets, as well as rice, pasta, and bread and make sure to choose whole grain options when having them with just 1 serving per meal about the size of your inner palm.  4.  Consume non starchy veggies daily working towards making them a good 50% of your daily food intake. Add them to lunch and dinner consistently.  5.  Start a daily probiotic: VSL#3 is recommended, (order on line at www.vsl3.com). Take 1 capsule daily with water for 30 days, then reduce to 1 every other day (this will reduce the cost). Capsules can be left out of refrigerator for 2 weeks. I recommend using a pill box weekly and  keeping the bottle in the fridge.    Please download consuelo My Fitness Pal, LoseIt! Or Net Diary to monitor daily dietary intake and you will be able to see if you are eating the right amount of calories or too much or too little which would hinder weight loss. Additionally this will help to see your daily carbohydrate and protein intake. When you set the consuelo up choose 1-1.5 lbs/week as a goal.  Keeping a paper food journal is an option as well to remain accountable for your choices- this is the start to mindful eating! A low calorie diet has been consistently shown to support weight loss.    Continue or start exercising to help establish a routine. If not already exercising begin with 1 day/week and progress as able with the goal of working towards 30 minutes 5 days a week at a minimum. A variety or cardio, strength and stretching is important. Review resources below to help support you in building this healthy routine.    Meditation daily can help manage and control stress. Chronic stress can make weight loss difficult.  Exercising is one way to help with stress, but meditation using the CALM Consuelo or another comparable alternative can be done in your home or place of work with little time commitment. This Consuelo can also help work on behavior change and improve sleep. Check out the segment under Calm Masterclass and listen to The 4 Pillars of Health. A great way to begin learning about the foundation of lifestyle with practical tips to use in your every day. In addition, we offer counseling services and support for individual connection and care. A referral is necessary so please let me know if this is a service you are interested.    Check out www.yourweightmatters.org blog for continued support and education along your weight loss journey to optimal health!      Patient Resources:    Personal Training/Fitness Classes/Health Coaching    [facility] in [place]: Full fitness center with group fitness  and personal training located in [place].  Health Coaching with Zahra Swain, Ranulfo Cruz, and Marvin Ram at our [facility]- individual coaching to work on your health goals. Call [phone] and/or email @ [email or web address]. Free 60 minute consult when client of [facility].  JAMES Kay @ [email or web address]. A variety of group fitness options plus various yoga classes [phone] and/or email Rebekah at [email or web address]  [facility] F[facility] Centers with multiple locations: Kawaii Museum (www.Appurify), F45 Training (www.Ashlar Holdings), Origo.by Body Bootcamp (www.Metrosis Software Development), Peloton Technology (www.Wikidot), The Exercise  (www.exercisecoach.com), Club Pilates (www.clubDiscovery Machine.Caption Data)    Online Fitness  Fitness  on IActive  Fit in 10 DVD series   www.lrpix07WSJFood Brasil  Chair exercises via Sit and Be Fit (www.sitandbefit.org) and [employer] (www.Mobile Accord.com) or Herminio Forde or Harris Hurst videos on YouTube.  Hip Hop Fit with Timi Bowles at www.hiphopfit.BeehiveID    Apps for on the Go Fitness  Albers 7 Minute Workout (orange box with white 7) - free on the go HIIT training consuelo  Peloton Consuelo @ www.onepeloton.com    Nutrition Trackers, Meal Preparation, and Other Meal Programs  LoseIT! And My Fitness Pal apps and on line for tracking nutrition  NOOM - virtual health coaching  FitFoundation (healthy meals on the go) in [place] @ www.jndepebteclcv2m.Caption Data  Biskasey MD @ www.bistromd.Caption Data and Dnbium44 (calorie smart and low carb plans recommended) @ www.tvaggj45.com, Metabolic Meals @ www.MyMetabolicMeals.com - individual prepared meals to go  Gobble, Blue Apron, Home , Every Plate, Sunbasket- on line meal delivery programs for preparation at home  Meal Village in [place] for homemade meals to go @ www.mealvillage.com  Diet Doctor @ www.dietdoctor.com - low carb swaps  ReciMe and Mealime consuelo (grocery and  meal planning)    Stress, Anxiety, Depression, Trauma  CALM meditation devon (www.calm.com)  Headspace  Don't let anxiety run your life. Using the science of emotion regulation and mindfulness to overcome fear and worry by Gerardo Fields PsyD and Herbie Rosado MA.  The LaunchRock Podcast (September 27, 2023): 6 Magic Words That Stop Anxiety  What Happened to You?- a look at the impact trauma has on behavior written by Duong Dodge and Dr. Kamar Laguna  Whole Again by Shravan Mcdonald - discovering your true self after trauma    Mindful Eating/The Hungry Brain  Am I Hungry? Mindful eating virtual  devon (www.amihungry.com)  The Hungry Brain by Ignacia Lorenzo, PhD  Mindless Eating by Andi Avendaño  Weight Loss Surgery Will Not Treat Food Addiction by Pretty Hansen Ph.D    Metabolic Dysfunction, Hormones and Cravings  Why We Get Sick? By Sarthak Whitaker (insulin resistance)  Your Body in Balance: The New Science of Food, Hormones, and Health by Dr. Nba Rivera  The Complete Guide to fasting by Dr. Noriega  Fast Like a Girl by Dr. Beverley Hobson  The M Factor (documentary on PBS about Menopause)  Sugar, Salt & Fat by Anupama Fuentes, Ph.D, R.D.  The Truth About Sugar - documentary on sugar (Free on Viva la Vita, https://youStepOne Healthu.be/0A6igzxPP6c)  Presentation on SUGAR called Sugar: The Bitter Truth by Dr. Jorge Bojorquez (Viva la Vita) https://youtu.be/[email or web address]-oM?[email or web address]  Reverse Visceral Fat: #1 Way to Increase Your Lifespan & End Inflammation with Dr. Michael Lowe on Utube @ https://youStepOne Healthu.be/nupPRnvUpJY?si=[email or web address]    Nutrition Support  You Are What You Eat - Netfix series on twin study looking at impact of nutrition changes on health  The End of Dieting: How to Live for Life by Dr. Humberto Sol M.D. or listen to The [employer] Podcast Episode 63: Understanding \"Nutritarian\" Eating w/Dr. Humberto Sol  The Game Changers- exsulin Documentary on plant based nutrition  The Dr. Anglin T5 Wellness Plan by   Kirby Anglin MD  The Complete Guide to fasting by Dr. Noriega  @[facility] ([facility] Dietician with support surrounding nutrition and meal prep/planning)    Education, Motivation and Support Resources  Live to 100: Secrets of the Blue Zones - Netflix series on the secrets to communities living over 100 years old  Atomic Habits by Kelechi Fajardo (a book about taking small steps to promote greater behavior change)   Motivation devon (black box with white \")- daily supportive messages sent to your phone  Can't Hurt Me by Gerardo Tracey (a book exploring the power of discipline in achieving your goals)  Fed Up - documentary about obesity (Free on Utube)  Www.yourweightmatters.org - Obesity Action Coalition sponsored Blog posts  Obesity Action Coalition Resources on topics specific to weight management (www.obesityaction.org)  Fitlosophy Fitspiration - journal to better health (journal book found at [employer] in fitness aisle)  Kiana Delatorre talk titled: The Call to Courage (Netflix)  The Exam Room by the Physician's Committee (Podcast)  Nutrition Facts by Dr. Muniz (Podcast)      Set your food environment up for success with tips listed below to help support your health journey and provide less temptations!  How to Create a Healthy Food Environment in Your Home  December 28, 2020  Posted in Blog, Nutrition  By Your Weight Matters Campaign    Why create a healthy food environment in your home? Every day, decisions we make about food are influenced by hundreds of factors, many of them subconscious. For example, an open box of cookies on the counter is more tempting than an apple in your crisper.  Having a healthy food environment at home will make it easier to choose healthy options and stay on track with habits that support your health goals.  Here are a few steps you can take to set up your home for success.  How to Build a Healthy Food Environment  1 - Clear Your Home of Less-healthy Food  These foods are okay in moderation, but  they can be enjoyed outside of the house in a fun and empowering way. Go through your kitchen fridge, freezer, pantry, cabinets and counters to toss out foods that aren't helping you meet your health goals.  2 - Put Healthy Foods in Plain Sight  Keep grab-n-go fruit on the counter instead of snack foods so they are easily accessible. Consider moving your refrigerated fruit to a shelf at eye-level instead of keeping it in the crisper. Vice versa, move less healthy foods to the crisper so they aren't always in your line of sight.  3 - Find Healthy Substitutions  Feeling deprived of your favorite foods can make you miserable and resentful. Instead, experiment with healthier alternatives. Here are some examples:  Calorie-controlled frozen yogurt bars like Yasso vs high-calorie ice cream novelties  Sparkling water like Bubbly vs soda or fruit juice  Skinny popcorn vs chips and crackers  Dessert hummus vs pudding cups or ice cream  4 - Clean and Organize  A messy kitchen can make it hard to find healthy choices when you are hungry or short on time. It also makes it hard to locate whether or not you need something from the grocery store. Give your kitchen a new year makeover and organize your food storage areas so you can always locate what you need without distraction.  5 - Eat at Your Kitchen Table  Eating at your kitchen table without distractions is a way to eat more mindfully and enjoy time with your family. Eating on the couch while watching shows or movies can encourage mindless eating and consuming more calories than intended.  6 - Keep Healthy Foods Readily Available  When you are hungry and in a pinch, it's important to have healthy foods on-hand so you don't make hunger-controlled choices. This means keeping ready-to-go-snack foods in your kitchen such as fruit paired with nut butter, hummus and carrots, lunch meat and cheese roll-ups, etc. It may also help to keep pre-made freezer meals on-hand (something  you've cooked in advance) for busy weeknights when you don't have time to prepare food.  Turning your home into a healthy food environment can set you up for success and confident decision-making. Plus, it feels good to be organized and empowered!    Mindful Eating Tips:  When we sit down to a meal by ourselves or with friends, it's easy to zone out and disconnect from our bodies. But, if you approach eating a meal with the intent to stay mindful and present, you will be able to enjoy yourself and walk away from the table feeling good about yourself and your choices. Here are several tips to keep in mind when it comes to food and eating.    Choose for yourself: Do not get hung up on what other people are eating. Instead, ask yourself what you would like to eat.    Forget about good and bad: Remind yourself that foods fall on a nutritional continuum (high value/low value), not on a moral continuum (good/bad).    Stay clear of guilt or shame: Refrain from allowing guilt or shame to contaminate your eating decisions. Avoid secret eating and get clear on who you are really hiding from if you eat in secret.    Choose foods that you like: Do not eat foods that you do not find satisfying or enjoyable. Eating that way will make you feel like you are on a diet.    Look before you eat: Before you eat, look at your food, its portion size, and presentation. Breathe deeply. Look again before taking a mouthful.    Chew every mouthful: Chewing a lot helps to thoroughly release the flavor of foods. Let food sit on your tongue. This allows your taste buds to absorb the flavor and transmit messages about your appetite to your brain.    Talk or eat: When you are talking, stop eating. When you are eating, stop talking.    Stay connected: Pay attention to your body's appetite signals while you are eating.    Pause while you are eating: Think about how you are feeling about your food in terms of quality and quantity.    Know when to stop  eating: Stop eating when flavor intensity declines, as it is bound to do. Do not try to polish off all of the food in front of you. Instead, aim for the moment when flavor peaks and you feel an internal “ah” of satisfaction--then stop.    Evaluate how full you are: Keep asking yourself while you are eating, “Am I still hungry?” and “Am I satisfied?”    by Zahra Swain- [facility]  and     Sugar - It’s Not as Sweet as You Think    by Vicky Jerome RN, BSN, CBN  OAC Summer 2014 @ https://www.obesityaction.org/resources/sugar-its-not-as-sweet-as-you-think/    According to the United [place] Department of Agriculture (USDA), dietary trends from 8600-7355, sugar consumption increased by 19 percent since 1970. Today, the average American consumes 20 teaspoons or 100 pounds of sugar each year! That amounts to 300 calories a day from sugar alone. And to make matters worse (you will read why later in this article), corn syrup consumption is on the rise, increasing by 387 percent in the same period of time. The largest amount of sugar is not being consumed in ingested fruits or other natural sugars. The largest amounts of consumed sugars are in the form of High Fructose Corn Syrup (HFCS) and brown sugar. Neither occurs naturally, and both are highly processed and in nearly every processed package food you will find in your local grocery store. Many of these foods you most likely would not suspect having sugar as an additive (see quiz at bottom of page).    What is sugar?  Sugar is a simple carbohydrate, which can either be a monosaccharide or disaccharide. Monosaccharides include glucose, fructose, and galactose. These three monosaccharides can join together to make the disaccharides maltose, sucrose, and lactose. These compounds are found in the foods we eat and are collectively called “sugar.”    The following are types of sugar and their natural source:    Most people associate the term “sugar” with  the white sugar we put in coffee or iced tea. The human body uses glucose, the simplest unit of carbohydrate, as its primary fuel. Without adequate carbohydrate intake, our bodies will obtain glucose, or fuel, from another source. The possibilities include a breakdown of proteins we eat or proteins stored in our body, which may ultimately lead to muscle loss and affecting one’s metabolic rate or even malnutrition. However, our need is far below the current daily consumption.    Is sugar addictive? You be the .  When high doses of sugar are consumed, it stimulates the release of dopamine in our brains. This response makes us feel pleasure (now you know why when you feel down or depressed you may want to overindulge in sweets). The drug morphine, cocaine and sugar all stimulate the same brain receptors. This study has been proven many times in lab rat studies.    In his new and fascinating book, Salt Sugar Fat: How the Food Giants Hooked Us, Pulitzer Prize-winning  Arnaldo Altamirano (I highly recommend this book) goes inside the world of processed and packaged foods. Adarsh writes in detail about how the food industry (which is 17 percent of our economy) contributes to American’s obesity epidemic by infusing processed foods with sugar, salt, and fat to make it more addictive and pleasurable. You see now why so many continue to buy their products?    According to the [facility] for Food Policy and Obesity, the average child sees 5,500 food commercials a year that advertise high sugar breakfast cereals, fast food, soft drinks, candy and snacks. According to the Federal Trade Commission, the food industry spends $1.6 billion annually to reach children through the media, including the Internet.    What is high fructose corn syrup?  High fructose corn syrup (HFCS) is an industrial food product and not “natural” or a naturally occurring substance. It is extracted from corn stalks through a secret process. The  sugars are extracted through a chemical enzymatic process resulting in HFCS.    Regular cane sugar (sucrose) is made of two-sugar molecules bound tightly together - glucose and fructose in equal amounts. The enzymes in your digestive tract must break down the sucrose into glucose and fructose, which are then absorbed into the body.    HFCS also consists of glucose and fructose, not in a 50-50 ratio, but a 55-45 fructose to glucose ratio in an unbound form. Fructose is sweeter than glucose. And HCFS is cheaper than sugar. One of the reasons is because of the government farm bill corn subsidies. Products with HFCS are much sweeter and much cheaper than products made with cane sugar.    Sugar and HFCS’ Effect on the Body  Eating sugar has a systemic effect on your entire body including increased risk for diabetes, increased appetite, weight gain, heart and liver problems, decreased immune system, certain cancers and even your brain function to name a few.    Type 2 Diabetes  Type 1 Diabetes is when one’s pancreas does not make insulin. Type 2 Diabetes is when one’s body does not utilize insulin effectively. Type 1 Diabetes is usually diagnosed at a young age. Type 2 Diabetes used to occur in adulthood and was called “Adult Onset Diabetes,” however; it has since been renamed to Type 2 Diabetes because the onset is commonly seen at a much earlier age as the obesity epidemic increases. It has been estimated that just fewer than 2,000,000 individuals were diagnosed with Type 2 diabetes in 2010.    The pancreas acts on ingested sugar by secreting insulin. Insulin is a hormone that regulates the amount of sugar in the blood. If blood sugar gets too high or too low, it could be life-threatening. An increased amount of sugar in one’s diet causes the pancreas to secrete insulin. In some individuals, this leads to an overload on the pancreas and the development of Type 2 diabetes.    Sugar, Appetite & Weight Gain  Eating less  sugar is linked with weight-loss, and eating more is linked with weight gain, according to a new review of published studies. The review lends support to the idea that advising people to limit the sugar in their diets may help lessen excess weight and obesity, the researchers conclude. “The really interesting finding is that increasing and decreasing sugar had virtually identical results (on weight), in the opposite direction of course,” says researcher MOUNIKA Isidro, PhD, professor of human nutrition and medicine at the [facility] in [facility].    According to leading nutritional expert, Leandro Fuentes MD, PhD, MPH, chair of nutrition at [employer] and author of Eat, Drink and Be Healthy, “Sugar increases body weight mainly by encouraging overeating.”    Heart and Liver Damage  A study published in the journal Hepatology in late 2012 found that consumption of fructose appears to affect the availability of the energy-transferring chemical ATP in the liver, thereby increasing the risk of liver cell malfunction and death.    In another review of HFCS, The American Journal of Clinical Nutrition, Renzo Arredondo, PhD, Department of Nutrition, [facility] explains that HFCS is absorbed more rapidly than regular sugar, and that it doesn’t stimulate insulin or leptin production. This prevents you from triggering the body’s signals for being full and may lead to overconsumption of total calories.    A 2012 paper in the journal Nature, brought forward the idea that limitations and warnings should be placed on sugar similar to warnings we see on alcohol. The authors showed evidence that fructose and glucose in excess can have a toxic effect on the liver as the metabolism of ethanol (the alcohol contained in alcoholic beverages) had similarities to the metabolic pathways of fructose.    Another published study in the Journal of Nutrition in 2012, found that children who  consumed high levels of fructose had lower blood levels of cardiovascular protective compounds, such as HDL cholesterol and adiponectin. Higher consumption of fructose led to higher levels of fat around the midsection, a significant risk factor for diabetes and cardiovascular disease.    Immune System  Eliminating all sugar from a cancer patient’s diet would harm healthy cells that need energy to function. For example, many fruits contain high levels of antioxidants which are known to be effective in fighting cancer; however, sugars that come from whole fruits are low in sugar. Plant-based nutrition is a benefit to our overall health including fighting or preventing cancers. These important antioxidants, phytochemicals, fiber, vitamins and minerals are found in these plant-based whole foods.    However, diets high in sugar and refined carbohydrates can lead to overweight and obesity, which indirectly increases cancer risk throughout time. Certain cancers including breast, prostate, colorectal and pancreatic are associated with obesity.    How can you avoid these unhealthy consequences of (often hidden) sugar?  The best way to avoid sugar is to not consume obvious foods that are loaded with sugar. However, as discussed in this article, there are many packaged foods that surprisingly have added sugar and the more health-damaging high fructose corn syrup.  Therefore…    Eat nature’s foods  Avoid processed food (I call these factory foods, not real foods.)  Don’t eat foods in packages (or dramatically decrease consumption)  Eat foods that rot  Eat foods that walked the earth, flew in the marah, swam in the ocean or grew in the soil     Beware!  Typically, the first ingredient on a label is the most prevalent in the food product; however, beware because there may be small amounts of many types of sugars, so none of them end up being in the first few ingredients (top 3) of the label. Sugar is disguised as a “healthy”  ingredient, such as honey, rice syrup, or even “organic dehydrated cane juice.”    Here is a list of some of the possible “sugar” code words:    Corn sweetener  Corn syrup, or corn syrup solids  Dehydrated Cane Juice  Dextrin  Dextrose  Fructose  Fruit juice concentrate  Glucose  High-fructose corn syrup  Honey  Invert sugar  Lactose  Maltodextrin  Malt syrup  Maltose  Maple syrup  Molasses  Raw sugar  Rice syrup  Saccharose  Sorghum or sorghum syrup  Sucrose  Syrup  Treacle  Turbinado sugar  Xylose    High Fructose Corn Sugar (HFCS) Quiz:    Which of the below listed foods contain High Fructose Corn Syrup?    Kraft Macaroni and Cheese  Stove Top Stuffing - Home style Herb  Kimmie Sun Iced Tea  Ocean Spray Cranberry Juice  Wild Cherry Lifesavers  Robitussan Cough and Congestion  Wonderbread - White Bread  Smuckers Grape Jelly  Escalante’s Vegetable Soup  Mr & Mrs T Bloody Stacy Mix  Nabisco Fig Newtons - Whole Grain  Nabisco Fig Newtons - Fat Free  Wishbone Classic Caesars’ Dressing  Chicken of the Sea White Tuna, [place]    Answer: All    Additional Resources:    The Truth About Sugar - documentary on sugar free on [facility] @ [email or web address]  SUGAR: The Bitter Truth by Dr. Jorge Bojorquez (pediatric endocrinologist) - Lecture on sugar for free on  [facility] @ [email or web address]    Mindful Eating Tips:  When we sit down to a meal by ourselves or with friends, it's easy to zone out and disconnect from our bodies. But, if you approach eating a meal with the intent to stay mindful and present, you will be able to enjoy yourself and walk away from the table feeling good about yourself and your choices. Here are several tips to keep in mind when it comes to food and eating.    Choose for yourself: Do not get hung up on what other people are eating. Instead, ask yourself what you would like to eat.    Forget about good and bad: Remind yourself that foods fall on a nutritional continuum  (high value/low value), not on a moral continuum (good/bad).    Stay clear of guilt or shame: Refrain from allowing guilt or shame to contaminate your eating decisions. Avoid secret eating and get clear on who you are really hiding from if you eat in secret.    Choose foods that you like: Do not eat foods that you do not find satisfying or enjoyable. Eating that way will make you feel like you are on a diet.    Look before you eat: Before you eat, look at your food, its portion size, and presentation. Breathe deeply. Look again before taking a mouthful.    Chew every mouthful: Chewing a lot helps to thoroughly release the flavor of foods. Let food sit on your tongue. This allows your taste buds to absorb the flavor and transmit messages about your appetite to your brain.    Talk or eat: When you are talking, stop eating. When you are eating, stop talking.    Stay connected: Pay attention to your body's appetite signals while you are eating.    Pause while you are eating: Think about how you are feeling about your food in terms of quality and quantity.    Know when to stop eating: Stop eating when flavor intensity declines, as it is bound to do. Do not try to polish off all of the food in front of you. Instead, aim for the moment when flavor peaks and you feel an internal “ah” of satisfaction--then stop.    Evaluate how full you are: Keep asking yourself while you are eating, “Am I still hungry?” and “Am I satisfied?”    by Zahra Encarnacion [facility]  and

## 2025-01-17 PROBLEM — Z90.49 HISTORY OF CHOLECYSTECTOMY: Status: ACTIVE | Noted: 2025-01-17

## 2025-01-24 ENCOUNTER — PATIENT MESSAGE (OUTPATIENT)
Dept: INTERNAL MEDICINE CLINIC | Facility: CLINIC | Age: 35
End: 2025-01-24

## 2025-01-24 DIAGNOSIS — E66.813 CLASS 3 SEVERE OBESITY WITH SERIOUS COMORBIDITY AND BODY MASS INDEX (BMI) OF 45.0 TO 49.9 IN ADULT, UNSPECIFIED OBESITY TYPE (HCC): ICD-10-CM

## 2025-01-24 DIAGNOSIS — G47.33 OSA ON CPAP: ICD-10-CM

## 2025-01-24 DIAGNOSIS — Z51.81 ENCOUNTER FOR THERAPEUTIC DRUG MONITORING: ICD-10-CM

## 2025-01-24 DIAGNOSIS — E66.01 CLASS 3 SEVERE OBESITY WITH SERIOUS COMORBIDITY AND BODY MASS INDEX (BMI) OF 45.0 TO 49.9 IN ADULT, UNSPECIFIED OBESITY TYPE (HCC): ICD-10-CM

## 2025-01-24 DIAGNOSIS — E88.819 INSULIN RESISTANCE: ICD-10-CM

## 2025-01-27 RX ORDER — TIRZEPATIDE 5 MG/.5ML
5 INJECTION, SOLUTION SUBCUTANEOUS WEEKLY
Qty: 2 ML | Refills: 2 | Status: SHIPPED | OUTPATIENT
Start: 2025-01-27 | End: 2025-02-11

## 2025-01-27 RX ORDER — TIRZEPATIDE 2.5 MG/.5ML
2.5 INJECTION, SOLUTION SUBCUTANEOUS WEEKLY
Qty: 2 ML | Refills: 0 | Status: SHIPPED | OUTPATIENT
Start: 2025-01-27 | End: 2025-02-11

## 2025-02-03 NOTE — TELEPHONE ENCOUNTER
Seen 1/16/25 no coverage for AOM  It is noted:   A PA may take one week plus to complete and our office will be in touch during this process if needed. If cost/supply prohibitive plan: contact office.

## 2025-02-10 NOTE — TELEPHONE ENCOUNTER
Unable to reapply for Zepbound 2.5mg on CMM (see below). Call placed to [facility] ([phone]).[facility] not [facility] them- advised to check with patient to find determine who manages PA.  MCM sent to patient asking to send updated insurance cards.

## 2025-02-11 RX ORDER — METFORMIN HYDROCHLORIDE 500 MG/1
2000 TABLET, EXTENDED RELEASE ORAL
Qty: 120 TABLET | Refills: 3 | Status: SHIPPED | OUTPATIENT
Start: 2025-02-11

## 2025-02-11 NOTE — TELEPHONE ENCOUNTER
Call placed to [facility] ([phone]).  Spoke with shubham- was informed patient does not have any weight loss medication coverage.  Shared PA would be for ANALISA- informed Zepbound is a plan exclusion.  Advised to contact patient's insurance plan to ask how/if can apply for coverage exception for Zepbound for ANALISA .      KW- please advise:    Patient had previously contacted insurance (see note below)- next steps would be to start a grievance or appeal to see if Zepbound could be covered for ANALISA- do you want to proceed down this path?         2 (mild pain)

## 2025-02-11 NOTE — TELEPHONE ENCOUNTER
No AOM coverage and Zepbound is a plan exclusion regardless of condition for treatment. Sent in Metformin ER as alternative with instructions. See [facility] [employer] message.

## 2025-02-22 ENCOUNTER — LAB ENCOUNTER (OUTPATIENT)
Dept: LAB | Age: 35
End: 2025-02-22
Attending: NURSE PRACTITIONER
Payer: COMMERCIAL

## 2025-02-22 DIAGNOSIS — R79.89 LOW TESTOSTERONE IN MALE: ICD-10-CM

## 2025-02-22 DIAGNOSIS — G47.33 OSA ON CPAP: ICD-10-CM

## 2025-02-22 DIAGNOSIS — E66.813 CLASS 3 SEVERE OBESITY WITH SERIOUS COMORBIDITY AND BODY MASS INDEX (BMI) OF 45.0 TO 49.9 IN ADULT, UNSPECIFIED OBESITY TYPE (HCC): ICD-10-CM

## 2025-02-22 DIAGNOSIS — Z51.81 ENCOUNTER FOR THERAPEUTIC DRUG MONITORING: ICD-10-CM

## 2025-02-22 DIAGNOSIS — Z90.49 HISTORY OF CHOLECYSTECTOMY: ICD-10-CM

## 2025-02-22 DIAGNOSIS — E66.01 CLASS 3 SEVERE OBESITY WITH SERIOUS COMORBIDITY AND BODY MASS INDEX (BMI) OF 45.0 TO 49.9 IN ADULT, UNSPECIFIED OBESITY TYPE (HCC): ICD-10-CM

## 2025-02-22 DIAGNOSIS — E88.819 INSULIN RESISTANCE: ICD-10-CM

## 2025-02-22 LAB
EST. AVERAGE GLUCOSE BLD GHB EST-MCNC: 111 MG/DL (ref 68–126)
HBA1C MFR BLD: 5.5 % (ref ?–5.7)
TESTOST SERPL-MCNC: 128.06 NG/DL
VIT B12 SERPL-MCNC: 908 PG/ML (ref 211–911)
VIT D+METAB SERPL-MCNC: 23 NG/ML (ref 30–100)

## 2025-02-22 PROCEDURE — 82607 VITAMIN B-12: CPT

## 2025-02-22 PROCEDURE — 83036 HEMOGLOBIN GLYCOSYLATED A1C: CPT

## 2025-02-22 PROCEDURE — 84403 ASSAY OF TOTAL TESTOSTERONE: CPT

## 2025-02-22 PROCEDURE — 36415 COLL VENOUS BLD VENIPUNCTURE: CPT

## 2025-02-22 PROCEDURE — 82306 VITAMIN D 25 HYDROXY: CPT

## 2025-02-24 DIAGNOSIS — E55.9 VITAMIN D DEFICIENCY: Primary | ICD-10-CM

## 2025-02-24 RX ORDER — ERGOCALCIFEROL 1.25 MG/1
50000 CAPSULE, LIQUID FILLED ORAL WEEKLY
Qty: 12 CAPSULE | Refills: 1 | Status: SHIPPED | OUTPATIENT
Start: 2025-02-24

## 2025-03-06 ENCOUNTER — PATIENT MESSAGE (OUTPATIENT)
Dept: FAMILY MEDICINE CLINIC | Facility: CLINIC | Age: 35
End: 2025-03-06

## 2025-03-17 ENCOUNTER — OFFICE VISIT (OUTPATIENT)
Dept: FAMILY MEDICINE CLINIC | Facility: CLINIC | Age: 35
End: 2025-03-17
Payer: COMMERCIAL

## 2025-03-17 VITALS
WEIGHT: 315 LBS | SYSTOLIC BLOOD PRESSURE: 132 MMHG | BODY MASS INDEX: 42.66 KG/M2 | TEMPERATURE: 98 F | HEART RATE: 84 BPM | DIASTOLIC BLOOD PRESSURE: 78 MMHG | HEIGHT: 72 IN | RESPIRATION RATE: 16 BRPM

## 2025-03-17 DIAGNOSIS — R79.89 LOW TESTOSTERONE IN MALE: Primary | ICD-10-CM

## 2025-03-17 PROCEDURE — 99214 OFFICE O/P EST MOD 30 MIN: CPT | Performed by: FAMILY MEDICINE

## 2025-03-17 PROCEDURE — G2211 COMPLEX E/M VISIT ADD ON: HCPCS | Performed by: FAMILY MEDICINE

## 2025-03-17 PROCEDURE — 3075F SYST BP GE 130 - 139MM HG: CPT | Performed by: FAMILY MEDICINE

## 2025-03-17 PROCEDURE — 3008F BODY MASS INDEX DOCD: CPT | Performed by: FAMILY MEDICINE

## 2025-03-17 PROCEDURE — 3078F DIAST BP <80 MM HG: CPT | Performed by: FAMILY MEDICINE

## 2025-03-17 RX ORDER — SYRINGE, DISPOSABLE, 3 ML
SYRINGE, EMPTY DISPOSABLE MISCELLANEOUS
Qty: 100 EACH | Refills: 0 | Status: SHIPPED | OUTPATIENT
Start: 2025-03-17

## 2025-03-17 RX ORDER — TESTOSTERONE CYPIONATE 1000 MG/10ML
100 INJECTION, SOLUTION INTRAMUSCULAR
Qty: 10 ML | Refills: 0 | Status: SHIPPED | OUTPATIENT
Start: 2025-03-17

## 2025-03-17 NOTE — PROGRESS NOTES
[facility] Family Medicine Office Note  Chief Complaint:   Chief Complaint   Patient presents with    Follow - Up     Testerone injections, starting again.        HPI:   This is a 34 year old male coming in for low testosterone level.  Patient does have a history of low testosterone and was taking testosterone replacement therapy in the past.  States he took it for about 6 months but has been off of it for a few years.  He continues to have low energy and low libido.  He understands the risk associated with taking exogenous hormone replacement and would like to proceed with restarting it.      Past Medical History:    Cancer (HCC)    testicular    Depression    Sleep apnea     Past Surgical History:   Procedure Laterality Date    Cholecystectomy      Orchiectomy    2007    Other surgical history      wrist surgery     Social History:  Social History     Socioeconomic History    Marital status: Single   Tobacco Use    Smoking status: Never    Smokeless tobacco: Never   Vaping Use    Vaping status: Never Used   Substance and Sexual Activity    Alcohol use: Yes     Comment: rarely    Drug use: Never    Sexual activity: Yes   Other Topics Concern    Caffeine Concern No    Exercise Yes    Seat Belt Yes    Special Diet No    Stress Concern Yes    Weight Concern Yes     Family History:  Family History   Problem Relation Age of Onset    Depression Mother     Cancer Mother         thyroid    Diabetes Father     Lipids Father     Hypertension Father     Cancer Maternal Grandmother         breast    Cancer Maternal Grandfather         leukemia, melenoma    Diabetes Maternal Grandfather     Cancer Paternal Grandmother         esophageal     Allergies:  Allergies[1]  Current Meds:  Current Outpatient Medications   Medication Sig Dispense Refill    Testosterone Cypionate 100 MG/ML Intramuscular Solution Inject 1 mL (100 mg total) into the muscle every 14 (fourteen) days. 10 mL 0    Syringe/Needle, Disp, 23G X 1-1/2\" 3  ML Does not apply Misc Use for IM testosterone administration every 14 days 100 each 0    Syringe, Disposable, (MONOJECT SYRINGE) 3 ML Does not apply Misc Use for IM testosterone therapy 100 each 0    venlafaxine ER 75 MG Oral Capsule SR 24 Hr Take 3 capsules (225 mg total) by mouth daily. 270 capsule 0    ergocalciferol 1.25 MG (10019 UT) Oral Cap Take 1 capsule (50,000 Units total) by mouth once a week. With food 12 capsule 1    metFORMIN  MG Oral Tablet 24 Hr Take 4 tablets (2,000 mg total) by mouth daily with food. Start 1 tab daily for 7 days, then increase to 2 tabs daily for 7 days, then increase to 3 tabs daily for 7 days and then increase to full dose as prescribed thereafter. 120 tablet 3    Azelastine-Fluticasone 137-50 MCG/ACT Nasal Suspension 2 sprays by Nasal route daily.      EPINEPHrine 0.3 MG/0.3ML Injection Solution Auto-injector Inject 0.3 mL (1 each total) into the muscle as needed.      fexofenadine 180 MG Oral Tab Take 1 tablet (180 mg total) by mouth daily.      Misc Natural Products (BEET ROOT OR)       MAGNESIUM GLYCINATE OR       Amylase-Lipase-Protease (DIGESTIVE ENZYMES OR)       azelastine 0.1 % Nasal Solution 2 sprays by Nasal route 2 (two) times daily. (Patient not taking: Reported on 3/17/2025)      PSYLLIUM HUSK OR  (Patient not taking: Reported on 3/17/2025)        Counseling given: Not Answered       REVIEW OF SYSTEMS:   ROS:  CONSTITUTIONAL:  Denies any unusual weight gain/loss, fever, chills, weakness or fatigue. + low energy and libido  CARDIOVASCULAR:  Denies chest pain, chest pressure or chest discomfort. No palpitations or edema.  Denies any dyspnea on exertion or at rest  RESPIRATORY:  Denies shortness of breath, cough  GASTROINTESTINAL:  Denies any abdominal pain, nausea, vomiting  NEUROLOGICAL:  Denies headache, dizziness, syncope, numbness or tingling in the extremities.  MUSCULOSKELETAL:  Denies muscle, back pain, joint pain or stiffness.    EXAM:   /78    Pulse 84   Temp 98.3 °F (36.8 °C) (Temporal)   Resp 16   Ht 6' (1.829 m)   Wt (!) 363 lb 12.8 oz (165 kg)   BMI 49.34 kg/m²  Estimated body mass index is 49.34 kg/m² as calculated from the following:    Height as of this encounter: 6' (1.829 m).    Weight as of this encounter: 363 lb 12.8 oz (165 kg).   Vital signs reviewed.Appears stated age, well groomed.  Physical Exam:  GEN:  Patient is alert and oriented x3, no apparent distress  HEAD:  Normocephalic, atraumatic  LUNGS: clear to auscultation bilaterally, no rales/rhonchi/wheezing  HEART:  Regular rate and rhythm, no murmurs, rubs or gallops  ABDOMEN:  Soft, nondistended, nontender, bowel sounds normal in all 4 quadrants, no hepatosplenomegaly  EXTREMITIES:  Strength intact with 5/5 bilaterally upper and lower extremities, no edema noted  NEURO:  CN 2 - 12 grossly intact     ASSESSMENT AND PLAN:   1. Low testosterone in male  -  symptomatic  -  restart testosterone IM 100mg every 14 days  -  repeat testosterone level 1.5 weeks after 6th injection  -  discussed possible increased risk for prostate cancer, heart disease, MI, stroke, infertility - patient understood and agreed with plan  -  f/u in 6 months and will check PSA at that time  - Testosterone Cypionate 100 MG/ML Intramuscular Solution; Inject 1 mL (100 mg total) into the muscle every 14 (fourteen) days.  Dispense: 10 mL; Refill: 0  - Syringe/Needle, Disp, 23G X 1-1/2\" 3 ML Does not apply Misc; Use for IM testosterone administration every 14 days  Dispense: 100 each; Refill: 0  - Syringe, Disposable, (MONOJECT SYRINGE) 3 ML Does not apply Misc; Use for IM testosterone therapy  Dispense: 100 each; Refill: 0      Meds & Refills for this Visit:  Requested Prescriptions     Signed Prescriptions Disp Refills    Testosterone Cypionate 100 MG/ML Intramuscular Solution 10 mL 0     Sig: Inject 1 mL (100 mg total) into the muscle every 14 (fourteen) days.    Syringe/Needle, Disp, 23G X 1-1/2\" 3 ML Does not  apply Misc 100 each 0     Sig: Use for IM testosterone administration every 14 days    Syringe, Disposable, (MONOJECT SYRINGE) 3 ML Does not apply Misc 100 each 0     Sig: Use for IM testosterone therapy       Health Maintenance:  Health Maintenance Due   Topic Date Due    COVID-19 Vaccine (4 - 2024-25 season) 09/01/2024    Influenza Vaccine (1) 10/01/2024    Annual Depression Screening  01/01/2025    Annual Physical  06/17/2025       Patient/Caregiver Education: Patient/Caregiver Education: There are no barriers to learning. Medical education done.   Outcome: Patient verbalizes understanding. Patient is notified to call with any questions, complications, allergies, or worsening or changing symptoms.  Patient is to call with any side effects or complications from the treatments as a result of today.     Problem List:  Patient Active Problem List   Diagnosis    Tenosynovitis of foot and ankle    Unspecified internal derangement of knee    Ganglion, unspecified    Body mass index 39.0-39.9, adult    Recurrent major depressive disorder, in full remission    Encounter for therapeutic drug monitoring    Class 3 severe obesity with serious comorbidity and body mass index (BMI) of 45.0 to 49.9 in adult (HCC)    ANALISA on CPAP    Insulin resistance    History of cholecystectomy       HOLLI COOL, DO    Please note that portions of this note may have been completed with a voice recognition program. Efforts were made to edit the dictations but occasionally words are mis-transcribed.         [1]   Allergies  Allergen Reactions    Dander Coughing, ITCHING, SHORTNESS OF BREATH and Tightness in Chest    Ciprofloxacin NAUSEA ONLY    Shellfish Allergy ITCHING

## 2025-03-27 ENCOUNTER — TELEPHONE (OUTPATIENT)
Dept: FAMILY MEDICINE CLINIC | Facility: CLINIC | Age: 35
End: 2025-03-27

## 2025-03-27 DIAGNOSIS — R79.89 LOW TESTOSTERONE IN MALE: Primary | ICD-10-CM

## 2025-03-27 NOTE — TELEPHONE ENCOUNTER
PA started for medication Testosterone. Insurance asking if patient has primary or secondary hypogonadism. Spoke with Dr. Couch and addition testing is needed. Labs pended. Please sign if okay. When signed, patient needs to be informed that additional testing is required for insurance decision on medication.

## 2025-03-28 ENCOUNTER — PATIENT MESSAGE (OUTPATIENT)
Dept: FAMILY MEDICINE CLINIC | Facility: CLINIC | Age: 35
End: 2025-03-28

## 2025-04-03 ENCOUNTER — LAB ENCOUNTER (OUTPATIENT)
Dept: LAB | Age: 35
End: 2025-04-03
Attending: FAMILY MEDICINE
Payer: COMMERCIAL

## 2025-04-03 DIAGNOSIS — R79.89 LOW TESTOSTERONE IN MALE: ICD-10-CM

## 2025-04-03 LAB
FSH SERPL-ACNC: 10.2 MIU/ML
LH SERPL-ACNC: 3.1 MIU/ML

## 2025-04-03 PROCEDURE — 83002 ASSAY OF GONADOTROPIN (LH): CPT

## 2025-04-03 PROCEDURE — 36415 COLL VENOUS BLD VENIPUNCTURE: CPT

## 2025-04-03 PROCEDURE — 83001 ASSAY OF GONADOTROPIN (FSH): CPT

## 2025-05-19 NOTE — PROGRESS NOTES
[facility] Weight Management follow up via video visit:    Subjective    This visit is conducted using Telemedicine with live, interactive video and audio.    Chief Complaint:  Routine follow up visit for lifestyle and medical management for overweight, obesity, or morbid obesity. LOV in clinic weight: 361#.    PMH reviewed. Bariatric surgery planned or hx of surgery in the past: no.    HPI:   Jorge Cristina is a 35 year old male who is being followed up today for lifestyle and medical management as deemed appropriate for overweight, obesity or morbid obesity. Patient reports he has not  since LOV 4 months ago in clinic as NP consult. Patient has been consistent with medication and tolerating without SEs. NO AOM coverage or Zepbound coverage for ANALISA.    Questions/Concerns/Comments since LOV: He has not seen the dietician. Labs completed and reviewed.     Lifestyle/Social Hx Reviewed:    [facility] Follow Up    Question 5/19/2025  8:10 AM CDT - Filed by Patient   Please describe a success moment: Belt seems to reach slightly farther around my waist   Please describe a challenging moment/needs for improvement: Trying to add more walking, but having the motivation after being on my feet for a long time at work is challenging   Please complete this 24 hour food journal, listing everything you had to eat in the past day. Include the average time of day you ate these meals at    List foods, qty and prep for breakfast: Two packs instant oatmeal, English muffin with jam and peanut butter at 18:00   List foods, qty and prep for lunch. Nothing   List foods, qty and prep for dinner. Conn's steak and egg bagel, sausage and egg McMuffin at 05:30   List foods, qty and prep for snacks. Nothing   List the types and qty of fluids consumed Water+Gatorade/zipfizz, approx 3/4 gallon   On average, how many meals did you eat out per week? 4   Exercise    How many days per week are you active or exercise 3   On  average, how many days were anaerobic (strength/resistance) exercises performed? 0   On average, how many days were aerobic (cardio) exercises performed? 3   Perceived level of exertion on a scale of 1-5, with 5 being very intense: 3   Stress    Average stress level on a scale of 1-10, with 10 being extremely stressed: 6   If greater than 5/1O how would you grade your coping mechanisms? fair   Sleep hours and integrity    How many hours of uninterrupted sleep do you get a night: 8   Do you feel rested in the morning: No   If no, what may have been disrupting your sleep? Unknown   Please list any goal(s) for your next visit Have at least some weight lost       ROS  General: feeling well, denies fatigue  EYES: denies vision changes or high pain/pressure.  CV: denies cp, palpitations  Resp: denies sob  GI: denies abdominal pain. Denies N/V/D/C.  Neuro: denies paresthesia or cognitive changes  Psych: denies any mood changes    Physical Exam:  >   BP Readings from Last 3 Encounters:   03/17/25 132/78   01/16/25 136/82   10/14/24 130/84       Home weight: see above  Home BP: not available  Home blood sugars: n/a    General: patient speaking in full sentences, no increased work of breathing. alert, appears stated age, cooperative, no distress, and morbidly obese  HENT: normocephalic  Resp: Breathing is non labored  Psych: patient appears cheerful, smiling, making good eye contact    Diagnoses and all orders for this visit:    Encounter for therapeutic drug monitoring  -     EKG 12 Lead; Future  -     metFORMIN  MG Oral Tablet 24 Hr; Take 4 tablets (2,000 mg total) by mouth daily with food.    Class 3 severe obesity with serious comorbidity and body mass index (BMI) of 45.0 to 49.9 in adult, unspecified obesity type  Comments:  Baseline BMI: 49.33 (3/17/25)  Orders:  -     EKG 12 Lead; Future  -     metFORMIN  MG Oral Tablet 24 Hr; Take 4 tablets (2,000 mg total) by mouth daily with food.    ANALISA on  CPAP  Comments:  AHI 25  Orders:  -     EKG 12 Lead; Future  -     metFORMIN  MG Oral Tablet 24 Hr; Take 4 tablets (2,000 mg total) by mouth daily with food.    Insulin resistance  -     EKG 12 Lead; Future  -     metFORMIN  MG Oral Tablet 24 Hr; Take 4 tablets (2,000 mg total) by mouth daily with food.    Insulin resistance  Comments:  Trig/HDL ratio 3.25, optimal goal <1.6  Orders:  -     EKG 12 Lead; Future  -     metFORMIN  MG Oral Tablet 24 Hr; Take 4 tablets (2,000 mg total) by mouth daily with food.        Patient Instructions   Continue making lifestyle changes that focus on good nutrition, regular exercise and stress management.    Medication Plan: Continue Metformin ER at current dose. Consider generic alternative to Qsymia (www.qsymia.com) with Phentermine and Topamax in the future, pending EKG - test ordered.    Next steps to work on before next visit include: Discussed the importance of building a healthy routine. Tips and tools listed below. Recommend consult with one our dieticians, Flash or Nikki.    Building a Healthy Routine for a Healthy Lifestyle  March 3, 2023  Posted in Blog, Motivation, Support  By [employer] [facility]    There are some people who seem to get everything done. They stay on top of tasks at work, manage their health and even spend time doing family activities. These people make it seem easy as they effortlessly go through life without missing a beat. Others seem to always be playing catch-up, feeling constantly behind and consistently stressed.    What’s the difference between these two people? Building a healthy routine.    What is a Routine?  A routine is a set of actions you do over and over again, consistently. Most people have many routines in their lives. For example, we make routines out of making coffee in the morning and driving to work. Turning on your computer and checking emails as your first work task is also a routine. These are things you do  automatically as part of your day.    However, not all routines are healthy. For instance, you can make an unhealthy routine out of going through the drive-thru for breakfast each morning. Maybe you routinely spend an hour or more watching television on the couch each night.    If you find yourself caught in one or more unhealthy routines, take some time to see how you can improve your lifestyle. Building a set of healthy routines throughout your day can make a big difference in both your physical and mental health.    Tips for Building a Healthy Routine  How do you get started? Building a healthy routine can be overwhelming to think about in the beginning stages, so keep these fundamental steps in mind.    Decide what’s important. Your routine can include a lot of different behaviors, so take some time to decide what your priorities are. Are you trying to eat better? Perhaps start by meal prepping on Sundays, making a balanced breakfast each morning or drinking at least three cups of water before lunchtime.    Determine where your routines should fit in. It’s sometimes obvious where in your lifestyle you need to develop healthier routines. It’s also not so obvious some of the time. Take a while to think about what parts of your day need some work. If you struggle to wake up or feel motivated in the morning, perhaps you could add a quick 10-minute walk to your routine. If your lunch breaks are usually spent sitting down at a desk or table, perhaps you could plan a short walk for this time instead.    Give your routine a try. Try your new routine for a few days and see how it’s working. If all is going well, keep your routine up. Eventually, it will become habitual and a lot easier to stick to. If your routine isn’t working out, don’t feel any shame! Modify it and keep experimenting.    Don’t get overwhelmed. Building a routine that works for you can take time. Don’t expect for it to happen overnight. Give yourself  some time to practice and build consistency. Save plenty of room for modification and for celebrating success!    Routines That Can Improve Your Health  Need a few ideas for routines that can help you improve your health? Try one or more of the tips below and see the difference they can make in your life.    Get up a little earlier. Mornings can be stressful when we rush to get everything together for the day. We’ve all had moments where chaotic mornings make it hard to even walk out the door! Having a few extra minutes in the morning can be a game-changer. Try setting your alarm for 30 minutes earlier to allow yourself time to sip on coffee, review your agenda, throw some dinner in a crock pot, write in a journal, or do whatever you need to do you can start your day on a positive note.    Prioritize sleep. People function better when they’re well-rested. Sleep should be a priority in your life, but many of us are guilty of letting ours fall to the wayside. Try making a bedtime ritual to ensure you are getting seven to eight hours of sleep each night. You will feel more energized and focused.    Make a plan. Writing a to-do list in the morning or the night before can help you stay on top of your responsibilities. Being able to see all your tasks in one place can help you stay focused and organized. For bonus points, delegate a task or two to your family. If someone else can do the grocery shopping while you run other errands or fold laundry, this can save you a lot of time and stress!     Don’t feel ashamed to ask for help.  We all have different personalities, schedules, likes and dislikes. Each one of our routines will be different, so try not to compare yourself to others. What will yours look like?      I recommend self monitoring to support behavior change and to learn how your environment individually impacts YOUR body. This will help promote intuitive eating practices. Goal is to track nutrition 2x/week via  paper log or using an devon such as "[employer] (www.ChromaDex.com) or LoseIT!. I also advise checking and logging a weekly home scale weight. Support via our counseling and dietician teams are available with a referral. Please let me know if this individualized care is desired.      Self-Monitoring - The Way to Successful Weight Management    By Claudia Kee RD, MAURAN, Birdie Turner RD, HEIDI, Napoleon Juarez PA-C, and Denise Pan MD    OAC www.obesityaction.org Winter 2008 Resource    One major and possibly most important behavioral interventional strategy for weight management and lifestyle change is self-monitoring. Behavioral interventions are a central aspect in treatments to promote lifestyle changes that lead to weight-loss, prevent weight gain or weight regain and improve physical fitness. In the past, self-monitoring has unfortunately been one of the least popular techniques for those in weight management programs, and in some cases it is even thought of as a punishment. Because self-monitoring is critical for success with lifestyle changes, it is important to look at the various self monitoring techniques.    What is Self-monitoring?  Self-monitoring refers to the observing and recording of eating and exercise patterns, followed by feedback on the behaviors. The goal of self-monitoring is to increase self-awareness of target behaviors and outcomes, thus it can serve as an early warning system if problems are arising and can help track success. Some commonly used self-monitoring techniques include:    Food diaries  Regular self-weighing  Exercise logs  Equipment such as pedometers, accelerometers and metabolic devices  Food Logs and Diaries  One of the most common and important types of self-monitoring strategies in weight management programs is keeping a food log, in which individuals record foods, exercises or beverages as soon as they are consumed.    One important technique with food logs is individuals  recording what they eat or drink as it is consumed; otherwise it may not give an accurate account of the day’s intake. A good “rule of thumb” for food logs is: “if you bite it, you write it!”    The minimum information for weight-loss that should be kept in food logs is type, amount and caloric content of food or beverage consumed. This provides the ability to track and balance the number of calories consumed throughout the day with the amount of calories expended throughout the day.    Other nutritional information that can be logged includes: time of day of eating, fat content and carbohydrate grams. Disease-specific food logs can also be kept. For example: focusing on carbohydrate content instead of calories for patients with diabetes or insulin resistance.    Food Diaries  Another helpful tool in self-monitoring is keeping a food diary. Food diaries differ from food logs because they include more detailed information. They are useful if you are trying to find behavioral reasons or psychological aspects for eating.    Depending on the person and behavioral complexities involved, some food diaries could include the stress level, mood or feelings surrounding eating, activity or location or other environmental or emotional triggers for eating. The more complex or detailed, the better the feedback.    However, in today’s society it is almost impossible for most people to keep highly detailed daily food records over the long-term, therefore, compliance is often very low with detailed food diaries. By suggesting that patients keep a detailed food record for a few days each week, perhaps major areas of focus for nutritional and behavioral intervention can be recognized.    Logging Your Food Online  Online food logs and diaries or computer software are quick and convenient ways to keep records of foods consumed in our technologically advanced world. Many Web sites are available for tracking of foods and calories  throughout the day, some of which are free and very easy to use.    You can look up food choices and/or alternative choices in online databases of more than 50,000 foods. Internet-savvy loggers may choose to keep their journals online. Others may just choose to use these databases as a more convenient way of looking up nutritional value of foods. Some free online diaries include:    Free Web sites for searching nutritional information are available, an example is www.YouTab. These Web sites may also offer exercise tracking and ideas, support, motivational tips and chat or discussion rooms.    Hand-held Calorie Counters  Another option for those who are “on the go” are handheld devices for calorie counting. Some of the devices are stand-alone such as CalorieSmart® or Budding BiologistCounter®. Others need to connect to Web sites. Other devices are installed in your Palm or Pocket-PC such as Diet Diary by [employer]. They let you download updates when nutrition facts change, however, some of them use a lot of memory.    Regular Weighing  Weighing yourself is an important and simple self-monitoring behavior to serve as reminder of one’s eating and physical activity habits. Although it may be hard and sometimes discouraging to weigh yourself while losing weight, it is recommended to weigh yourself weekly, preferably outside of the home on the same scale.    Using the scale at the local gym or exercise facility or your doctor’s office may be more accurate than home scales. However, if this is unrealistic, it is okay to use a home scale. Try to weigh yourself at the same time of day and the same day of the week.    Writing down your weekly weights on a table, graph or calendar can help you keep track of your success or to help you get back on track more quickly. It is important to note that weighing yourself more frequently than weekly is not recommended, as day to day fluctuations are not indicators of actual weight.  Regular monitoring of your weight is also essential to help you maintain your weight after losing weight.    Exercise Logs  Another self-monitoring technique, along the same lines as food logs and diaries, is keeping an exercise log or diary. The number of minutes engaged and type and level of exertion of physical activity should ideally be recorded.    An important and often forgotten aspect of exercise logs is the level of perceived exertion. Walking for 30 minutes, at an easy compared to a hard pace, will result in different levels of calories burned and cardiovascular impact.    Typically, an easy physical activity that does not increase heart rate much, or alter breathing would usually be the pace that you walk around work or go shopping. Moderate level of physical exertion is when you are getting a mildly increased heart and breathing rate. Heavy or hard level of physical exertion would be sweating, increased heart rate (target heart rate range) as well as increased breathing.    Remember, physical activity can be done at one time or intermittently throughout the day. Logging exercise can be a positive feedback or a reminder to incorporate more exercise or physical activity into your daily routine.    Initial activities may be walking, riding a stationary bike or swimming at a slow pace. Other types of exercise that can be fun are dancing, exercise videos or chair exercises. You should try to aim for 30 minutes of exercise on most days of the week.    Many people try to start out with exercise on three or four days of the week. However, if you can get yourself exercising most to all days of the week, even if only for 10 or 15 minutes, it will become more of a routine for you.    Healthy Lifestyle Tip  All adults should set a long-term goal to accumulate at least 30 minutes or more of moderate-intensity physical activity on most to all days of the week. Also, try to increase activities of daily living such as  taking the stairs instead of the elevator, parking further away or walking to a bathroom that is further from your desk. Reducing sedentary time is a good strategy to increase activity by undertaking frequent, less strenuous activities. With time, you may be able to engage in more strenuous activities.    Pedometers  Self-monitoring tools are becoming more and more popular and accurate. One of the simplest of these self-monitoring tools is a pedometer. Pedometers give objective data of physical activity throughout the day. Pedometers can be found in almost any consumer [employer] or retail store.    Some of the more popular manufactures include Digi-Walker, LumiFold, Instant Opinion, Surreal InkÂº, FotoSwipe, [employer], Freestyle, [employer], Serometrix and many others. Mi[employer]o and "[employer] make pedometer of speedometer devices that calculate steps and speed using GPS. These clip-on devices are inexpensive, ranging from less than $15 up to $75.    Many people get an average of 3,000 steps per day with daily activity. In order to burn off extra calories for weight-loss, walking 10,000 steps per day is recommended. For regular health, a minimum of 6,000 steps per day is required. Research suggests that a deliberate walk of 4,000-6,000 steps will help with weight-loss. It is often a good idea to keep track of your daily steps taken in your exercise log.    Pedometers can be frustrating for those who are more interested in distance traveled. Focusing on the number of steps and ways to incorporate more steps throughout the day will make as much of a difference with weight-loss as actual distance does. Pedometers encourage people to find ways to add more steps throughout the day.    Because step counting is becoming more popular, advances are being made in the technology behind pedometers. New pedometers display steps and count them accurately. They are meant to be worn everyday and all day, as motivation to keep stepping, Most are small  and comfortable to wear.    Pedometers sense your body motion, counting your footsteps usually by a turned pendulum technology, a coiled spring mechanism and a hairspring mechanism (which is the least accurate). The unit should be accurate in its count when you wear it correctly. You may need to experiment with where to wear it. You can measure your stride and then the pedometer can estimate distance traveled.    Some pedometers today offer multifunction options like calorie estimates, clocks, timers, stopwatches, speed estimators, seven-day memory or pulse rate readers, voice feedback and radios.    Accelerometers  Although pedometers are very cost-effective, one of the main flaws in using pedometers, however, is that they do not record intensity (how hard) or duration (how long) or frequency (how often) movement occurs. Accelerometers are devices that can objectively measure frequency, duration and intensity of physical activity.    Accelerometers provide a high level of accuracy when assessing physical activity. There are a variety of commercially available accelerometers, or activity monitors, which come in a wide range of prices anywhere from $50 to $1,000. Flossonicrainer and Nike are examples of affordable accelerometers.    Many of the more expensive accelerometers are used only in research or as a part of a hospital-based program. These monitors are more complex than pedometers in that they display and store more complex data. Some are designed to download to a computer for analysis of intensity levels, movements and physical activity patterns. They can also be used to estimate calories burned or energy expenditure.    Accelerometers have sophisticated sensors that convert physical movement into an electrical signal that is relative to the muscular force needed to produce the work. Accelerometers can be found in uniaxial or triaxial measures. Uniaxial accelerometers measure in a single plane and can be attached  to the trunk or limbs. Triaxial accelerometers measure along three planes: vertical, medial-lateral and anterior-posterior.    Although accelerometers are a step up from pedometers in accuracy of physical activity, they cannot register resistance. Therefore, if you are strength training or adding resistance to your bike or treadmill or adding an incline to your walking, it will not be able to discern the added level of energy required to do that work.    Metabolic Devices  One of the most accurate and most expensive tools for self-monitoring are tools that have very sophisticated monitoring and interpreting sensors for calories burned. Many of these devices have options to subscribe to a Web-based calorie counter system that integrates the amount of calories burned measured by the equipment and your calories consumed that you enter in easy to use food logs.    These devices are more accurate in measurements of calories expended because they use not only accelerometer technology, but also heat flux sensors, galvanic skin response (to measure physical exertion and emotional stimuli) and skin temperature gauges. Some also include heart rate monitoring techniques. All of these technologies combined lead to a very accurate measurement of calories expended throughout the day.    These devices can determine if you are sitting, sleeping, jogging, walking, lifting weights or riding in a car. Many of these devices are very expensive and used primarily for research, however, some are available commercially.    This technology is also employed by hospital-based programs. Patients wear the hospital’s armband and track their nutrition on the Web site or computer-based program for typically one to two weeks. When they return to the clinic, the information will be uploaded and the practitioners will be able to work with the patients with objective data on metabolic lifestyle patterns.    Practitioners can also monitor patients on  integrated software applications to provide consultations without being face to face. Practitioners have the ability to set daily goals to tailor programs to the individual patient. These are great tools to help objectively monitor behavior and physical activity, as well as providing real time feed back to the patient. [employer] is one company that offers this technology.    Conclusion  Although specific diseases and treatments vary, behavior modification is the major key in weight-loss or prevention and decreases the risk of diseases. Self-monitoring is a key to behavior modifications, and there are a multitude of ways to self-monitor. With technology advancements, self-monitoring techniques are changing and improving to help defeat some of the major barriers to compliance. The bottom line is that no matter how you do it, self-monitoring should be an important part of your weight-loss, weight maintenance or healthy lifestyle change. Then, the next step is to be sure the self-monitoring translates into positive behavior changes with regards to diet and exercise.      I also recommend modifying nutrition with a focus on Food 4 Fuel and eating clean, lean and green!     Eat whole foods (think farm to table sources) and minimize/eliminate processed and ultra processed foods.  Eat lean sources of protein, recommending incorporating plant based options (no fat/cholesterol in plants) and focus on lean animal protein sources such as eggs, chicken and fish. Minimize beef such as pork and red meat to 1 serving/week.  Increase the consumption of plant based foods with a goal of making 85% of your daily nutrition from plants. Plant based foods are less calorically dense and more nutritionally dense. They do not have fat, which can contribute to insulin resistance and elevated cholesterol. Fruits and vegetables provide an array of vitamins, minerals, phytonutrients (plant protectors) and antiinflammatory properties. All these  components help to prevent disease and help your body to work properly!      Re-thinking Nutrition: Learning to See Food as Fuel  October 8, 2019  Posted in Blog, Nutrition  [employer] [facility] [facility]    “Dieting” can start to feel challenging when we begin to associate healthy behaviors with “punishment.” Too often, we overlook the real reason we eat food. It's not only meant to be enjoyed, but also to provide basic fuel for our bodies.  Learning to see food as fuel can help you find balance in your journey with weight. It will teach you about the primal purpose of food, the effect certain foods have on health, and how to listen carefully to what your body is trying to tell you.  It Starts with Cells  Did you know your body has more than 35 trillion cells? Each one serves a purpose.  Once a cell has completed its purpose, it dies. Your body replaces dead and worn-out cells with new and energetic ones. It also depends on this ongoing cell cycle to keep you healthy. And guess what? The foods you eat help shape this process.  Seeing Food as Fuel  Eating the right foods helps build and repair cells to be stronger than before. It does this by getting nutrition from whole grains, vitamins, minerals, fats, protein and other nutrients.  These essential nutrients matter greatly to every single cell in your body. They make up your:  Cell membrane  Nucleus  Mitochondria  When cells join together, they make tissue that brings life to your bones, brain, skin, nerves, muscles, etc. The health and lifespan of your cells depend on the nutrients you get from food. That is why healthy food choices are so important.   Once you can start to see food as being fuel for your body, you will get better at making the healthy choice the easy choice. Food will be less about rewards or punishments and more about supporting your overall health and happiness.  Building Blocks of Good Nutrition  A diet without enough fiber, protein and  healthy fats can cause your cells to become brittle, leaky and tired. When cells can't do what they are designed to do, problems like inflammation, cancer and other difficult health conditions start to arise.  Foods that Support Healthy Cells:  Unsaturated Fats - Fish, nuts avocados, olive oil, flax seed, etc.  Protein - Poultry, lean beef, yogurt, eggs, seeds, beans, etc.  Antioxidants - Fruits, dark green veggies, sweet potatoes, tea, etc.  So, the next time you grab something to eat, ask yourself how that food helps or hurts your body. This is a part of living mindful and being knowledgeable about what you consume regularly.  When you start to see food as fuel, not just a tasty treat or the solution to a bad temper, you will start to make healthier choices more often. Making new habits is one of the building blocks to successful long-term weight management!  Clean Eating: What is it Really About?    March 18, 2022  Posted in Blog, Nutrition  [employer] [facility] [facility]    Many people focus on “clean eating.” In a world filled with potato chips, fast food burgers and cake, switching to clean eating can be a big change. Is it time for you to make it?    What is Clean Eating?  Eating clean has many variations and has gained popularity over the last several years. It focuses on choosing whole foods and minimally-processed foods and is more of a philosophy than a diet. The goal is to choose foods as close to their natural state as possible. Adding fruits, vegetables, meats, and whole grains is a great start. Processed foods such as chips, cookies and fats are eliminated.    Unlike other plans, clean eating isn’t specifically about portion sizes or numbers. Some find this way of eating to be easier. There is limited research on eating clean; however, a clean diet is plant-based and low in saturated fat.    Tips for Eating Clean    Limit packaged processed foods. Some of this is obvious. It’s time to trade in the  chips, cookies and snack cakes for other foods such as fruits, vegetables and nuts. Additionally, you might need to change how you prepare food. Swap boxed mashed potatoes for whole baked potatoes. Instead of bagged salad, chop your own salad from fresh vegetables.    Read the labels. With the focus on minimally-processed foods, the fewer the ingredients the better. Avoid added sugar, sweeteners and preservatives.  Find other ways to spice up your food. Fresh herbs can go further than any added preservative. Basil, cilantro or chives can spice up any meal.  Choose whole grains. White bread and refined grains should be limited. Instead, choose whole-grain bread, quinoa, brown rice and oats.    Hydrate with water. Water is your main source of fluid. For some variety and extra flavor, add a slice of lime or cucumber into your water. Sodas and sugary drinks should be eliminated. Herbal teas are a great option.  Dive into dairy. Milk, unsweetened yogurt and cheese can be great choices. Try to avoid sweetened milks or yogurts.  Pack the protein. Protein from beans, nuts and legumes are great. So are lean meats without fatty flavorings. Some clean eaters avoid meat from certain grocery stores or brands due to growth hormones and antibiotics. For those, choosing organic may be an option.    Take Things Slow  It can be a little overwhelming to begin a clean eating plan. Throwing away the contents of your cabinet may not be an option. Instead of taking away, focus on adding. Add more fruit and more vegetables to your day. You will find that by doing this, there is less room for processed foods. Small changes over time can add up. Get started today!      What are proteins?  Proteins are one of three primary macronutrients that provide energy to the human body, along with fats and carbohydrates. Proteins are also responsible for a large portion of the work that is done in cells; they are necessary for proper structure and  function of tissues and organs, and also act to regulate them. They are comprised of a number of amino acids that are essential to proper body function, and serve as the building blocks of body tissue.  There are 20 different amino acids in total, and the sequence of amino acids determines a protein's structure and function. While some amino acids can be synthesized in the body, there are 9 amino acids that humans can only obtain from dietary sources (insufficient amounts of which may sometimes result in death), termed essential amino acids. Foods that provide all of the essential amino acids are called complete protein sources, and include both animal (meat, dairy, eggs, fish) as well as plant-based sources (soy, quinoa, buckwheat).    Proteins can be categorized based on the function they provide to the body. Below is a list of some types of proteins:  Antibody--proteins that protect the body from foreign particles, such as viruses and bacteria, by binding to them  Enzyme--proteins that help form new molecules as well as perform the many chemical reactions that occur throughout the body  Messenger--proteins that transmit signals throughout the body to maintain body processes  Structural component--proteins that act as building blocks for cells that ultimately allow the body to move  Transport/storage--proteins that move molecules throughout the body  As can be seen, proteins have many important roles throughout the body, and as such, it is important to provide sufficient nutrition to the body to maintain healthy protein levels.    How much protein do I need?  The amount of protein that the human body requires daily is dependent on many conditions, including overall energy intake, growth of the individual, and physical activity level. It is often estimated based on body weight, as a percentage of total caloric intake (10-35%), or based on age alone. 0.8g/kg of body weight is a commonly cited recommended dietary  allowance (RDA). This value is the minimum recommended value to maintain basic nutritional requirements, but consuming more protein, up to a certain point, maybe beneficial, depending on the sources of the protein.  The recommended range of protein intake is between 0.8 g/kg and 1.8 g/kg of body weight, dependent on the many factors listed above. People who are highly active, or who wish to build more muscle should generally consume more protein. Some sources suggest consuming between 1.8 to 2 g/kg for those who are highly active. The amount of protein a person should consume, to date, is not an exact science, and each individual should consult a specialist, be it a dietitian, doctor, or , to help determine their individual needs. I recommend your daily protein intake to be 150 grams/day.    Foods high in protein  There are many different combinations of food that a person can eat to meet their protein intake requirements. For many people, a large portion of protein intake comes from meat and dairy, though it is possible to get enough protein while meeting certain dietary restrictions you might have. Generally, it is easier to meet your RDA of protein by consuming meat and dairy, but an excess of either can have a negative health impact. There are plenty of plant-based protein options, but they generally contain less protein in a given serving. Ideally, a person should consume a mixture of meat, dairy, and plant-based foods in order to meet their RDA and have a balanced diet replete with nutrients.    If possible, consuming a variety of complete proteins is recommended. A complete protein is a protein that contains a good amount of each of the nine essential amino acids required in the human diet. Examples of complete protein foods or meals include:    Meat/Dairy examples  Eggs  Chicken breast  Cottage cheese  Greek yogurt  Milk  Lean beef  Tuna  Turkey breast  Fish  Shrimp    Vegan/plant-based  examples  Buckwheat  Hummus and elio  Soy products (tofu, tempeh, edamame beans)  Peanut butter on toast or some other bread  Beans and rice  Quinoa  Hemp and osmar seeds  Spirulina  Generally, meat, poultry, fish, eggs, and dairy products are complete protein sources. Nuts and seeds, legumes, grains, and vegetables, among other things, are usually incomplete proteins. There is nothing wrong with incomplete proteins however, and there are many healthy, high protein foods that are incomplete proteins. As long as you consume a sufficient variety of incomplete proteins to get all the required amino acids, it is not necessary to specifically eat complete protein foods. In fact, certain high fat red meats for example, a common source of complete proteins, can be unhealthy.   Below are some examples of high protein foods that are not complete proteins:  Almonds  Oats   Broccoli  Lentils  Madan bread  Osmar seeds  Pumpkin seeds  Peanuts  [place] sprouts  Grapefruit  Green peas  Avocados  Mushrooms  As can be seen, there are many different foods a person can consume to meet their RDA of protein. The examples provided above do not constitute an exhaustive list of high protein or complete protein foods. As with everything else, balance is important, and the examples provided above are an attempt at providing a list of healthier protein options (when consumed in moderation).    Amount of protein in common food      Protein Amount  Milk (1 cup/8 oz)  8 g  Egg (1 large/50 g)  6 g  Meat (1 slice / 2 oz)  14 g  Seafood (2 oz)  16 g  Bread (1 slice/64 g)   8 g  Corn (1 cup/166 g)  16 g  Rice (1 cup/195 g)  5 g  Dry Bean (1 cup/92 g)   16 g  Nuts (1 cup/92 g)    20 g  Fruits and Veggie (1 cup)  1 g    Data above taken from www.calculator.net    The Power of Protein:    High Protein Foods:  FISH  (3-6 ounces/meal)  All types of fish  Seafood (shrimp, scallops, clams, mussels, lobster)  EGGS     2-3 eggs/meal  DAIRY (2/3 to 1 ½  cup)  Cottage cheese   Greek yogurt  PLANTS (½-3/4 cup/meal)  Legumes: Dried beans and peas (black beans, arriaga beans, garbanzo beans, kidney, cannellini, navy, split peas, black eyed peas)  Lentils  Quinoa  Soy (edamame, tofu)  PORK   (3-6 oz/meal)  Tenderloin  Pork chop  Top loin roast, boneless  Sirloin roast, boneless  [place] hunt (nitrate free)  Boiled deli ham (nitrate free)    Additional Protein Sources:  BEEF    (3-6 oz/meal)  Flank steak       Skirt steak  Bottom round(rump roast), select    Ground beef, 90% lean (ground sirloin)  Fred eye steak, choice  Eye of round roast, choice   POULTRY  (3-6 oz/meal)  Ground chicken or turkey  Chicken, no skin  Turkey, no skin  PROTEIN SHAKES: OWYN, Koia, and Rebbl (plant based and dairy free), Corepower by [employer]aitlin (plant based option available), Premier Protein, JuicePlus Complete (www.juiceplus.com)  PROTEIN BARS: RXBAR, PowerCrunch, Quest, Barebells, Mosh  SNACK/ON-the-GO OPTIONS: Chomps Meatstick, Oats Overnight (www.oatPrixing[employer]), Donora brand protein granola    Nutrition and MyPlate: Vegetables  Vegetables are a major source of fiber. They’re also packed with vitamins needed for health and growth. At mealtimes, make half your plate fruits and vegetables.  Nutrient-rich choices  Fresh, frozen, or canned--all vegetables are high in nutrients. The color of the skin tells you what’s inside. So if you eat plenty of colors, you get a variety of nutrients. Some good choices include:  Dark green vegetables, such as spinach, alex greens, kale, and broccoli.  Bright red and orange vegetables, such as carrots, sweet potatoes, red bell peppers, and tomatoes.  Starchy vegetables, such as potatoes and squash.  What makes vegetables less healthy?  Boiling vegetables causes some vitamins to escape into the water. To hold on to vitamins, briefly steam, sauté, stir-cardona, or microwave instead. Overcooking destroys vitamins, so try to keep vegetables a little  crispy.  Using a lot of margarine, butter, or salad dressing adds fat and calories, but not many nutrients. A small amount of these toppings is OK. But the more you add, the more fat you add, too.  Frozen vegetables that come with cheese sauce or other processed flavoring are high in fat and salt. It's healthier to season plain frozen vegetables yourself. Try fresh herbs, garlic, toasted almonds, or sesame seeds.  Canned vegetables often have lots of salt. Shop for low-sodium varieties.  One small change  Sneak vegetables into every meal. Shred carrots into hamburger, or add zucchini to spaghetti and meatballs. You won't even notice! Have a better idea? Write it here:  ________________________________________________________  Date Last Reviewed: 10/1/2017  © 3840-5267 [employer]. [address] [place] [address]. All rights reserved. This information is not intended as a substitute for professional medical care. Always follow your healthcare professional's instructions.        Return in about 3 months (around 8/20/2025) for as scheduled.    DOCUMENTATION OF TIME SPENT: Code selection for this visit was based on time spent : 30 minutes on date of service in preparing to see the patient, obtaining and/or reviewing separately obtained history, performing a medically appropriate examination, counseling and educating the patient/family/caregiver, ordering medications or testing, referring and communicating with other healthcare providers, documenting clinical information in the electronic medical record, independently interpreting results and communicating results to the patient/family/caregiver and care coordination with the patient's other providers.  Answers submitted by the patient for this visit:  Medical Weight Loss Follow Up (Submitted on 5/19/2025)  If greater than 5/1O how would you grade your coping mechanisms?: fair

## 2025-05-20 ENCOUNTER — TELEMEDICINE (OUTPATIENT)
Dept: INTERNAL MEDICINE CLINIC | Facility: CLINIC | Age: 35
End: 2025-05-20
Payer: COMMERCIAL

## 2025-05-20 DIAGNOSIS — G47.33 OSA ON CPAP: ICD-10-CM

## 2025-05-20 DIAGNOSIS — E66.813 CLASS 3 SEVERE OBESITY WITH SERIOUS COMORBIDITY AND BODY MASS INDEX (BMI) OF 45.0 TO 49.9 IN ADULT, UNSPECIFIED OBESITY TYPE: ICD-10-CM

## 2025-05-20 DIAGNOSIS — E88.819 INSULIN RESISTANCE: ICD-10-CM

## 2025-05-20 DIAGNOSIS — Z51.81 ENCOUNTER FOR THERAPEUTIC DRUG MONITORING: Primary | ICD-10-CM

## 2025-05-20 PROBLEM — E66.9 OBESITY, UNSPECIFIED: Status: ACTIVE | Noted: 2025-01-16

## 2025-05-20 PROCEDURE — 99214 OFFICE O/P EST MOD 30 MIN: CPT | Performed by: NURSE PRACTITIONER

## 2025-05-20 RX ORDER — METFORMIN HYDROCHLORIDE 500 MG/1
2000 TABLET, EXTENDED RELEASE ORAL
Qty: 120 TABLET | Refills: 3 | Status: SHIPPED | OUTPATIENT
Start: 2025-05-20

## 2025-05-20 NOTE — PATIENT INSTRUCTIONS
Continue making lifestyle changes that focus on good nutrition, regular exercise and stress management.    Medication Plan: Continue Metformin ER at current dose. Consider generic alternative to Qsymia (www.[email or web address].com) with Phentermine and Topamax in the future, pending EKG - test ordered.    Next steps to work on before next visit include: Discussed the importance of building a healthy routine. Tips and tools listed below. Recommend consult with one our dieticians, Flash or Nikki.    Building a Healthy Routine for a Healthy Lifestyle  March 3, 2023  Posted in Blog, Motivation, Support  By [employer] [facility]    There are some people who seem to get everything done. They stay on top of tasks at work, manage their health and even spend time doing family activities. These people make it seem easy as they effortlessly go through life without missing a beat. Others seem to always be playing catch-up, feeling constantly behind and consistently stressed.    What’s the difference between these two people? Building a healthy routine.    What is a Routine?  A routine is a set of actions you do over and over again, consistently. Most people have many routines in their lives. For example, we make routines out of making coffee in the morning and driving to work. Turning on your computer and checking emails as your first work task is also a routine. These are things you do automatically as part of your day.    However, not all routines are healthy. For instance, you can make an unhealthy routine out of going through the drive-thru for breakfast each morning. Maybe you routinely spend an hour or more watching television on the couch each night.    If you find yourself caught in one or more unhealthy routines, take some time to see how you can improve your lifestyle. Building a set of healthy routines throughout your day can make a big difference in both your physical and mental health.    Tips for Building a Healthy  Routine  How do you get started? Building a healthy routine can be overwhelming to think about in the beginning stages, so keep these fundamental steps in mind.    Decide what’s important. Your routine can include a lot of different behaviors, so take some time to decide what your priorities are. Are you trying to eat better? Perhaps start by meal prepping on Sundays, making a balanced breakfast each morning or drinking at least three cups of water before lunchtime.    Determine where your routines should fit in. It’s sometimes obvious where in your lifestyle you need to develop healthier routines. It’s also not so obvious some of the time. Take a while to think about what parts of your day need some work. If you struggle to wake up or feel motivated in the morning, perhaps you could add a quick 10-minute walk to your routine. If your lunch breaks are usually spent sitting down at a desk or table, perhaps you could plan a short walk for this time instead.    Give your routine a try. Try your new routine for a few days and see how it’s working. If all is going well, keep your routine up. Eventually, it will become habitual and a lot easier to stick to. If your routine isn’t working out, don’t feel any shame! Modify it and keep experimenting.    Don’t get overwhelmed. Building a routine that works for you can take time. Don’t expect for it to happen overnight. Give yourself some time to practice and build consistency. Save plenty of room for modification and for celebrating success!    Routines That Can Improve Your Health  Need a few ideas for routines that can help you improve your health? Try one or more of the tips below and see the difference they can make in your life.    Get up a little earlier. Mornings can be stressful when we rush to get everything together for the day. We’ve all had moments where chaotic mornings make it hard to even walk out the door! Having a few extra minutes in the morning can be a  game-changer. Try setting your alarm for 30 minutes earlier to allow yourself time to sip on coffee, review your agenda, throw some dinner in a crock pot, write in a journal, or do whatever you need to do you can start your day on a positive note.    Prioritize sleep. People function better when they’re well-rested. Sleep should be a priority in your life, but many of us are guilty of letting ours fall to the wayside. Try making a bedtime ritual to ensure you are getting seven to eight hours of sleep each night. You will feel more energized and focused.    Make a plan. Writing a to-do list in the morning or the night before can help you stay on top of your responsibilities. Being able to see all your tasks in one place can help you stay focused and organized. For bonus points, delegate a task or two to your family. If someone else can do the grocery shopping while you run other errands or fold laundry, this can save you a lot of time and stress!     Don’t feel ashamed to ask for help.  We all have different personalities, schedules, likes and dislikes. Each one of our routines will be different, so try not to compare yourself to others. What will yours look like?      I recommend self monitoring to support behavior change and to learn how your environment individually impacts YOUR body. This will help promote intuitive eating practices. Goal is to track nutrition 2x/week via paper log or using an devon such as Appvance (www.Pharnext.com) or LoseIT!. I also advise checking and logging a weekly home scale weight. Support via our counseling and dietician teams are available with a referral. Please let me know if this individualized care is desired.      Self-Monitoring - The Way to Successful Weight Management    By Claudia Kee RD, HEIDI, Birdie Turner RD, HEIDI, Napoleon Juarez PA-C, and Denise Pan MD    [facility] www.obesityaction.org Winter 2008 Resource    One major and possibly most important behavioral  interventional strategy for weight management and lifestyle change is self-monitoring. Behavioral interventions are a central aspect in treatments to promote lifestyle changes that lead to weight-loss, prevent weight gain or weight regain and improve physical fitness. In the past, self-monitoring has unfortunately been one of the least popular techniques for those in weight management programs, and in some cases it is even thought of as a punishment. Because self-monitoring is critical for success with lifestyle changes, it is important to look at the various self monitoring techniques.    What is Self-monitoring?  Self-monitoring refers to the observing and recording of eating and exercise patterns, followed by feedback on the behaviors. The goal of self-monitoring is to increase self-awareness of target behaviors and outcomes, thus it can serve as an early warning system if problems are arising and can help track success. Some commonly used self-monitoring techniques include:    Food diaries  Regular self-weighing  Exercise logs  Equipment such as pedometers, accelerometers and metabolic devices  Food Logs and Diaries  One of the most common and important types of self-monitoring strategies in weight management programs is keeping a food log, in which individuals record foods, exercises or beverages as soon as they are consumed.    One important technique with food logs is individuals recording what they eat or drink as it is consumed; otherwise it may not give an accurate account of the day’s intake. A good “rule of thumb” for food logs is: “if you bite it, you write it!”    The minimum information for weight-loss that should be kept in food logs is type, amount and caloric content of food or beverage consumed. This provides the ability to track and balance the number of calories consumed throughout the day with the amount of calories expended throughout the day.    Other nutritional information that can be  logged includes: time of day of eating, fat content and carbohydrate grams. Disease-specific food logs can also be kept. For example: focusing on carbohydrate content instead of calories for patients with diabetes or insulin resistance.    Food Diaries  Another helpful tool in self-monitoring is keeping a food diary. Food diaries differ from food logs because they include more detailed information. They are useful if you are trying to find behavioral reasons or psychological aspects for eating.    Depending on the person and behavioral complexities involved, some food diaries could include the stress level, mood or feelings surrounding eating, activity or location or other environmental or emotional triggers for eating. The more complex or detailed, the better the feedback.    However, in today’s society it is almost impossible for most people to keep highly detailed daily food records over the long-term, therefore, compliance is often very low with detailed food diaries. By suggesting that patients keep a detailed food record for a few days each week, perhaps major areas of focus for nutritional and behavioral intervention can be recognized.    Logging Your Food Online  Online food logs and diaries or computer software are quick and convenient ways to keep records of foods consumed in our technologically advanced world. Many Web sites are available for tracking of foods and calories throughout the day, some of which are free and very easy to use.    You can look up food choices and/or alternative choices in online databases of more than 50,000 foods. Internet-savvy loggers may choose to keep their journals online. Others may just choose to use these databases as a more convenient way of looking up nutritional value of foods. Some free online diaries include:    Free Web sites for searching nutritional information are available, an example is [email or web address] Systems. These Web sites may also offer exercise tracking and  ideas, support, motivational tips and chat or discussion rooms.    Hand-held Calorie Counters  Another option for those who are “on the go” are handheld devices for calorie counting. Some of the devices are stand-alone such as CalorieSmart® or HealthFitCounter®. Others need to connect to Web sites. Other devices are installed in your Palm or Pocket-PC such as Diet Diary by [employer]. They let you download updates when nutrition facts change, however, some of them use a lot of memory.    Regular Weighing  Weighing yourself is an important and simple self-monitoring behavior to serve as reminder of one’s eating and physical activity habits. Although it may be hard and sometimes discouraging to weigh yourself while losing weight, it is recommended to weigh yourself weekly, preferably outside of the home on the same scale.    Using the scale at the local gym or exercise facility or your doctor’s office may be more accurate than home scales. However, if this is unrealistic, it is okay to use a home scale. Try to weigh yourself at the same time of day and the same day of the week.    Writing down your weekly weights on a table, graph or calendar can help you keep track of your success or to help you get back on track more quickly. It is important to note that weighing yourself more frequently than weekly is not recommended, as day to day fluctuations are not indicators of actual weight. Regular monitoring of your weight is also essential to help you maintain your weight after losing weight.    Exercise Logs  Another self-monitoring technique, along the same lines as food logs and diaries, is keeping an exercise log or diary. The number of minutes engaged and type and level of exertion of physical activity should ideally be recorded.    An important and often forgotten aspect of exercise logs is the level of perceived exertion. Walking for 30 minutes, at an easy compared to a hard pace, will result in different levels  of calories burned and cardiovascular impact.    Typically, an easy physical activity that does not increase heart rate much, or alter breathing would usually be the pace that you walk around work or go shopping. Moderate level of physical exertion is when you are getting a mildly increased heart and breathing rate. Heavy or hard level of physical exertion would be sweating, increased heart rate (target heart rate range) as well as increased breathing.    Remember, physical activity can be done at one time or intermittently throughout the day. Logging exercise can be a positive feedback or a reminder to incorporate more exercise or physical activity into your daily routine.    Initial activities may be walking, riding a stationary bike or swimming at a slow pace. Other types of exercise that can be fun are dancing, exercise videos or chair exercises. You should try to aim for 30 minutes of exercise on most days of the week.    Many people try to start out with exercise on three or four days of the week. However, if you can get yourself exercising most to all days of the week, even if only for 10 or 15 minutes, it will become more of a routine for you.    Healthy Lifestyle Tip  All adults should set a long-term goal to accumulate at least 30 minutes or more of moderate-intensity physical activity on most to all days of the week. Also, try to increase activities of daily living such as taking the stairs instead of the elevator, parking further away or walking to a bathroom that is further from your desk. Reducing sedentary time is a good strategy to increase activity by undertaking frequent, less strenuous activities. With time, you may be able to engage in more strenuous activities.    Pedometers  Self-monitoring tools are becoming more and more popular and accurate. One of the simplest of these self-monitoring tools is a pedometer. Pedometers give objective data of physical activity throughout the day. Pedometers  can be found in almost any consumer [employer] or retail store.    Some of the more popular manufactures include Digi-Walker, ProPlan, [employer], Sayduck, ID Watchdog, Spoonity, Freestyle, Tapdaq, [employer] and many others. [employer] and [employer] make pedometer of speedometer devices that calculate steps and speed using GPS. These clip-on devices are inexpensive, ranging from less than $15 up to $75.    Many people get an average of 3,000 steps per day with daily activity. In order to burn off extra calories for weight-loss, walking 10,000 steps per day is recommended. For regular health, a minimum of 6,000 steps per day is required. Research suggests that a deliberate walk of 4,000-6,000 steps will help with weight-loss. It is often a good idea to keep track of your daily steps taken in your exercise log.    Pedometers can be frustrating for those who are more interested in distance traveled. Focusing on the number of steps and ways to incorporate more steps throughout the day will make as much of a difference with weight-loss as actual distance does. Pedometers encourage people to find ways to add more steps throughout the day.    Because step counting is becoming more popular, advances are being made in the technology behind pedometers. New pedometers display steps and count them accurately. They are meant to be worn everyday and all day, as motivation to keep stepping, Most are small and comfortable to wear.    Pedometers sense your body motion, counting your footsteps usually by a turned pendulum technology, a coiled spring mechanism and a hairspring mechanism (which is the least accurate). The unit should be accurate in its count when you wear it correctly. You may need to experiment with where to wear it. You can measure your stride and then the pedometer can estimate distance traveled.    Some pedometers today offer multifunction options like calorie estimates, clocks, timers, stopwatches, speed estimators,  seven-day memory or pulse rate readers, voice feedback and radios.    Accelerometers  Although pedometers are very cost-effective, one of the main flaws in using pedometers, however, is that they do not record intensity (how hard) or duration (how long) or frequency (how often) movement occurs. Accelerometers are devices that can objectively measure frequency, duration and intensity of physical activity.    Accelerometers provide a high level of accuracy when assessing physical activity. There are a variety of commercially available accelerometers, or activity monitors, which come in a wide range of prices anywhere from $50 to $1,000. BioTrainer and [employer] are examples of affordable accelerometers.    Many of the more expensive accelerometers are used only in research or as a part of a hospital-based program. These monitors are more complex than pedometers in that they display and store more complex data. Some are designed to download to a computer for analysis of intensity levels, movements and physical activity patterns. They can also be used to estimate calories burned or energy expenditure.    Accelerometers have sophisticated sensors that convert physical movement into an electrical signal that is relative to the muscular force needed to produce the work. Accelerometers can be found in uniaxial or triaxial measures. Uniaxial accelerometers measure in a single plane and can be attached to the trunk or limbs. Triaxial accelerometers measure along three planes: vertical, medial-lateral and anterior-posterior.    Although accelerometers are a step up from pedometers in accuracy of physical activity, they cannot register resistance. Therefore, if you are strength training or adding resistance to your bike or treadmill or adding an incline to your walking, it will not be able to discern the added level of energy required to do that work.    Metabolic Devices  One of the most accurate and most expensive tools for  self-monitoring are tools that have very sophisticated monitoring and interpreting sensors for calories burned. Many of these devices have options to subscribe to a Web-based calorie counter system that integrates the amount of calories burned measured by the equipment and your calories consumed that you enter in easy to use food logs.    These devices are more accurate in measurements of calories expended because they use not only accelerometer technology, but also heat flux sensors, galvanic skin response (to measure physical exertion and emotional stimuli) and skin temperature gauges. Some also include heart rate monitoring techniques. All of these technologies combined lead to a very accurate measurement of calories expended throughout the day.    These devices can determine if you are sitting, sleeping, jogging, walking, lifting weights or riding in a car. Many of these devices are very expensive and used primarily for research, however, some are available commercially.    This technology is also employed by hospital-based programs. Patients wear the hospital’s armband and track their nutrition on the Web site or computer-based program for typically one to two weeks. When they return to the clinic, the information will be uploaded and the practitioners will be able to work with the patients with objective data on metabolic lifestyle patterns.    Practitioners can also monitor patients on integrated software applications to provide consultations without being face to face. Practitioners have the ability to set daily goals to tailor programs to the individual patient. These are great tools to help objectively monitor behavior and physical activity, as well as providing real time feed back to the patient. [employer] is one company that offers this technology.    Conclusion  Although specific diseases and treatments vary, behavior modification is the major key in weight-loss or prevention and decreases the risk of  diseases. Self-monitoring is a key to behavior modifications, and there are a multitude of ways to self-monitor. With technology advancements, self-monitoring techniques are changing and improving to help defeat some of the major barriers to compliance. The bottom line is that no matter how you do it, self-monitoring should be an important part of your weight-loss, weight maintenance or healthy lifestyle change. Then, the next step is to be sure the self-monitoring translates into positive behavior changes with regards to diet and exercise.      I also recommend modifying nutrition with a focus on Food 4 Fuel and eating clean, lean and green!     Eat whole foods (think farm to table sources) and minimize/eliminate processed and ultra processed foods.  Eat lean sources of protein, recommending incorporating plant based options (no fat/cholesterol in plants) and focus on lean animal protein sources such as eggs, chicken and fish. Minimize beef such as pork and red meat to 1 serving/week.  Increase the consumption of plant based foods with a goal of making 85% of your daily nutrition from plants. Plant based foods are less calorically dense and more nutritionally dense. They do not have fat, which can contribute to insulin resistance and elevated cholesterol. Fruits and vegetables provide an array of vitamins, minerals, phytonutrients (plant protectors) and antiinflammatory properties. All these components help to prevent disease and help your body to work properly!      Re-thinking Nutrition: Learning to See Food as Fuel  October 8, 2019  Posted in Blog, Nutrition  By Your [employer] [facility]    “Dieting” can start to feel challenging when we begin to associate healthy behaviors with “punishment.” Too often, we overlook the real reason we eat food. It's not only meant to be enjoyed, but also to provide basic fuel for our bodies.  Learning to see food as fuel can help you find balance in your journey with weight.  It will teach you about the primal purpose of food, the effect certain foods have on health, and how to listen carefully to what your body is trying to tell you.  It Starts with Cells  Did you know your body has more than 35 trillion cells? Each one serves a purpose.  Once a cell has completed its purpose, it dies. Your body replaces dead and worn-out cells with new and energetic ones. It also depends on this ongoing cell cycle to keep you healthy. And guess what? The foods you eat help shape this process.  Seeing Food as Fuel  Eating the right foods helps build and repair cells to be stronger than before. It does this by getting nutrition from whole grains, vitamins, minerals, fats, protein and other nutrients.  These essential nutrients matter greatly to every single cell in your body. They make up your:  Cell membrane  Nucleus  Mitochondria  When cells join together, they make tissue that brings life to your bones, brain, skin, nerves, muscles, etc. The health and lifespan of your cells depend on the nutrients you get from food. That is why healthy food choices are so important.   Once you can start to see food as being fuel for your body, you will get better at making the healthy choice the easy choice. Food will be less about rewards or punishments and more about supporting your overall health and happiness.  Building Blocks of Good Nutrition  A diet without enough fiber, protein and healthy fats can cause your cells to become brittle, leaky and tired. When cells can't do what they are designed to do, problems like inflammation, cancer and other difficult health conditions start to arise.  Foods that Support Healthy Cells:  Unsaturated Fats - Fish, nuts avocados, olive oil, flax seed, etc.  Protein - Poultry, lean beef, yogurt, eggs, seeds, beans, etc.  Antioxidants - Fruits, dark green veggies, sweet potatoes, tea, etc.  So, the next time you grab something to eat, ask yourself how that food helps or hurts  your body. This is a part of living mindful and being knowledgeable about what you consume regularly.  When you start to see food as fuel, not just a tasty treat or the solution to a bad temper, you will start to make healthier choices more often. Making new habits is one of the building blocks to successful long-term weight management!  Clean Eating: What is it Really About?    March 18, 2022  Posted in Blog, Nutrition  By Your Weight [employer] [facility]    Many people focus on “clean eating.” In a world filled with potato chips, fast food burgers and cake, switching to clean eating can be a big change. Is it time for you to make it?    What is Clean Eating?  Eating clean has many variations and has gained popularity over the last several years. It focuses on choosing whole foods and minimally-processed foods and is more of a philosophy than a diet. The goal is to choose foods as close to their natural state as possible. Adding fruits, vegetables, meats, and whole grains is a great start. Processed foods such as chips, cookies and fats are eliminated.    Unlike other plans, clean eating isn’t specifically about portion sizes or numbers. Some find this way of eating to be easier. There is limited research on eating clean; however, a clean diet is plant-based and low in saturated fat.    Tips for Eating Clean    Limit packaged processed foods. Some of this is obvious. It’s time to trade in the chips, cookies and snack cakes for other foods such as fruits, vegetables and nuts. Additionally, you might need to change how you prepare food. Swap boxed mashed potatoes for whole baked potatoes. Instead of bagged salad, chop your own salad from fresh vegetables.    Read the labels. With the focus on minimally-processed foods, the fewer the ingredients the better. Avoid added sugar, sweeteners and preservatives.  Find other ways to spice up your food. Fresh herbs can go further than any added preservative. Basil, cilantro or  chives can spice up any meal.  Choose whole grains. White bread and refined grains should be limited. Instead, choose whole-grain bread, quinoa, brown rice and oats.    Hydrate with water. Water is your main source of fluid. For some variety and extra flavor, add a slice of lime or cucumber into your water. Sodas and sugary drinks should be eliminated. Herbal teas are a great option.  Dive into dairy. Milk, unsweetened yogurt and cheese can be great choices. Try to avoid sweetened milks or yogurts.  Pack the protein. Protein from beans, nuts and legumes are great. So are lean meats without fatty flavorings. Some clean eaters avoid meat from certain grocery stores or brands due to growth hormones and antibiotics. For those, choosing organic may be an option.    Take Things Slow  It can be a little overwhelming to begin a clean eating plan. Throwing away the contents of your cabinet may not be an option. Instead of taking away, focus on adding. Add more fruit and more vegetables to your day. You will find that by doing this, there is less room for processed foods. Small changes over time can add up. Get started today!      What are proteins?  Proteins are one of three primary macronutrients that provide energy to the human body, along with fats and carbohydrates. Proteins are also responsible for a large portion of the work that is done in cells; they are necessary for proper structure and function of tissues and organs, and also act to regulate them. They are comprised of a number of amino acids that are essential to proper body function, and serve as the building blocks of body tissue.  There are 20 different amino acids in total, and the sequence of amino acids determines a protein's structure and function. While some amino acids can be synthesized in the body, there are 9 amino acids that humans can only obtain from dietary sources (insufficient amounts of which may sometimes result in death), termed essential  amino acids. Foods that provide all of the essential amino acids are called complete protein sources, and include both animal (meat, dairy, eggs, fish) as well as plant-based sources (soy, quinoa, buckwheat).    Proteins can be categorized based on the function they provide to the body. Below is a list of some types of proteins:  Antibody--proteins that protect the body from foreign particles, such as viruses and bacteria, by binding to them  Enzyme--proteins that help form new molecules as well as perform the many chemical reactions that occur throughout the body  Messenger--proteins that transmit signals throughout the body to maintain body processes  Structural component--proteins that act as building blocks for cells that ultimately allow the body to move  Transport/storage--proteins that move molecules throughout the body  As can be seen, proteins have many important roles throughout the body, and as such, it is important to provide sufficient nutrition to the body to maintain healthy protein levels.    How much protein do I need?  The amount of protein that the human body requires daily is dependent on many conditions, including overall energy intake, growth of the individual, and physical activity level. It is often estimated based on body weight, as a percentage of total caloric intake (10-35%), or based on age alone. 0.8g/kg of body weight is a commonly cited recommended dietary allowance (RDA). This value is the minimum recommended value to maintain basic nutritional requirements, but consuming more protein, up to a certain point, maybe beneficial, depending on the sources of the protein.  The recommended range of protein intake is between 0.8 g/kg and 1.8 g/kg of body weight, dependent on the many factors listed above. People who are highly active, or who wish to build more muscle should generally consume more protein. Some sources suggest consuming between 1.8 to 2 g/kg for those who are highly active.  The amount of protein a person should consume, to date, is not an exact science, and each individual should consult a specialist, be it a dietitian, doctor, or , to help determine their individual needs. I recommend your daily protein intake to be 150 grams/day.    Foods high in protein  There are many different combinations of food that a person can eat to meet their protein intake requirements. For many people, a large portion of protein intake comes from meat and dairy, though it is possible to get enough protein while meeting certain dietary restrictions you might have. Generally, it is easier to meet your RDA of protein by consuming meat and dairy, but an excess of either can have a negative health impact. There are plenty of plant-based protein options, but they generally contain less protein in a given serving. Ideally, a person should consume a mixture of meat, dairy, and plant-based foods in order to meet their RDA and have a balanced diet replete with nutrients.    If possible, consuming a variety of complete proteins is recommended. A complete protein is a protein that contains a good amount of each of the nine essential amino acids required in the human diet. Examples of complete protein foods or meals include:    Meat/Dairy examples  Eggs  Chicken breast  Cottage cheese  Greek yogurt  Milk  Lean beef  Tuna  Turkey breast  Fish  Shrimp    Vegan/plant-based examples  Buckwheat  Hummus and elio  Soy products (tofu, tempeh, edamame beans)  Peanut butter on toast or some other bread  Beans and rice  Quinoa  Hemp and madalyn seeds  Spirulina  Generally, meat, poultry, fish, eggs, and dairy products are complete protein sources. Nuts and seeds, legumes, grains, and vegetables, among other things, are usually incomplete proteins. There is nothing wrong with incomplete proteins however, and there are many healthy, high protein foods that are incomplete proteins. As long as you consume a sufficient  variety of incomplete proteins to get all the required amino acids, it is not necessary to specifically eat complete protein foods. In fact, certain high fat red meats for example, a common source of complete proteins, can be unhealthy.   Below are some examples of high protein foods that are not complete proteins:  Almonds  Oats   Broccoli  Lentils  Madan bread  Osmar seeds  Pumpkin seeds  Peanuts  [place] sprouts  Grapefruit  Green peas  Avocados  Mushrooms  As can be seen, there are many different foods a person can consume to meet their RDA of protein. The examples provided above do not constitute an exhaustive list of high protein or complete protein foods. As with everything else, balance is important, and the examples provided above are an attempt at providing a list of healthier protein options (when consumed in moderation).    Amount of protein in common food      Protein Amount  Milk (1 cup/8 oz)  8 g  Egg (1 large/50 g)  6 g  Meat (1 slice / 2 oz)  14 g  Seafood (2 oz)  16 g  Bread (1 slice/64 g)   8 g  Corn (1 cup/166 g)  16 g  Rice (1 cup/195 g)  5 g  Dry Bean (1 cup/92 g)   16 g  Nuts (1 cup/92 g)    20 g  Fruits and Veggie (1 cup)  1 g    Data above taken from www.calculator.net    The Power of Protein:    High Protein Foods:  FISH  (3-6 ounces/meal)  All types of fish  Seafood (shrimp, scallops, clams, mussels, lobster)  EGGS     2-3 eggs/meal  DAIRY (2/3 to 1 ½ cup)  Cottage cheese   Greek yogurt  PLANTS (½-3/4 cup/meal)  Legumes: Dried beans and peas (black beans, arriaga beans, garbanzo beans, kidney, cannellini, navy, split peas, black eyed peas)  Lentils  Quinoa  Soy (edamame, tofu)  PORK   (3-6 oz/meal)  Tenderloin  Pork chop  Top loin roast, boneless  Sirloin roast, boneless  [place] hunt (nitrate free)  Boiled deli ham (nitrate free)    Additional Protein Sources:  BEEF    (3-6 oz/meal)  Flank steak       Skirt steak  Bottom round(rump roast), select    Ground beef, 90% lean (ground  tha)  Fred eye steak, choice  Eye of round roast, choice   POULTRY  (3-6 oz/meal)  Ground chicken or turkey  Chicken, no skin  Turkey, no skin  PROTEIN SHAKES: ALEX, Megan, and Paramjit (plant based and dairy free), Corepower by Kaitlin Hoffman (plant based option available), Premier Protein, JuicePlus Complete (www.juiceplus.com)  PROTEIN BARS: RXBAR, PowerCrunch, Quest, Barebells, Mosh  SNACK/ON-the-GO OPTIONS: Chomps Meatstick, Oats Overnight (www.oatsovernwiMAN), [place] brand protein granola    Nutrition and MyPlate: Vegetables  Vegetables are a major source of fiber. They’re also packed with vitamins needed for health and growth. At mealtimes, make half your plate fruits and vegetables.  Nutrient-rich choices  Fresh, frozen, or canned--all vegetables are high in nutrients. The color of the skin tells you what’s inside. So if you eat plenty of colors, you get a variety of nutrients. Some good choices include:  Dark green vegetables, such as spinach, alex greens, kale, and broccoli.  Bright red and orange vegetables, such as carrots, sweet potatoes, red bell peppers, and tomatoes.  Starchy vegetables, such as potatoes and squash.  What makes vegetables less healthy?  Boiling vegetables causes some vitamins to escape into the water. To hold on to vitamins, briefly steam, sauté, stir-cardona, or microwave instead. Overcooking destroys vitamins, so try to keep vegetables a little crispy.  Using a lot of margarine, butter, or salad dressing adds fat and calories, but not many nutrients. A small amount of these toppings is OK. But the more you add, the more fat you add, too.  Frozen vegetables that come with cheese sauce or other processed flavoring are high in fat and salt. It's healthier to season plain frozen vegetables yourself. Try fresh herbs, garlic, toasted almonds, or sesame seeds.  Canned vegetables often have lots of salt. Shop for low-sodium varieties.  One small change  Sneak vegetables into every  meal. Shred carrots into hamburger, or add zucchini to spaghetti and meatballs. You won't even notice! Have a better idea? Write it here:  ________________________________________________________  Date Last Reviewed: 10/1/2017  © 1762-7614 [employer]. [address] [place], [place] [id number]. All rights reserved. This information is not intended as a substitute for professional medical care. Always follow your healthcare professional's instructions.

## 2025-05-27 ENCOUNTER — TELEPHONE (OUTPATIENT)
Dept: FAMILY MEDICINE CLINIC | Facility: CLINIC | Age: 35
End: 2025-05-27

## 2025-05-27 NOTE — TELEPHONE ENCOUNTER
Sky, Pharm Zakiya Cortez called and states she is needing additional information regarding patient's PA for his Testosterone RX.      I asked Sky if possible to fax over the questionnaire for us to fill out.  Informed her that at this time, not sure if hypogonadism male for patient is primary or secondary.  Patient does have 1 more testosterone level that he needs to do that is due for on 6/17/2025.    Per Sky, she will make a note to fax over the PA form and states, they will either fax it over or someone else will call to complete the PA after the next level is done.

## 2025-06-02 ENCOUNTER — MED REC SCAN ONLY (OUTPATIENT)
Dept: FAMILY MEDICINE CLINIC | Facility: CLINIC | Age: 35
End: 2025-06-02

## 2025-06-10 NOTE — TELEPHONE ENCOUNTER
Patient needs med f/u    Last office visit: 6/17/24  Next office visit: The patient is asked to return in 6 months pending lab results.   Last Refill:[id number]    Requested Prescriptions     Pending Prescriptions Disp Refills    venlafaxine ER 75 MG Oral Capsule SR 24 Hr 270 capsule 0     Sig: Take 3 capsules (225 mg total) by mouth daily.

## 2025-06-11 RX ORDER — VENLAFAXINE HYDROCHLORIDE 75 MG/1
225 CAPSULE, EXTENDED RELEASE ORAL DAILY
Qty: 90 CAPSULE | Refills: 0 | Status: SHIPPED | OUTPATIENT
Start: 2025-06-11 | End: 2025-09-09

## 2025-06-18 ENCOUNTER — PATIENT MESSAGE (OUTPATIENT)
Dept: INTERNAL MEDICINE CLINIC | Facility: CLINIC | Age: 35
End: 2025-06-18

## 2025-06-23 ENCOUNTER — OFFICE VISIT (OUTPATIENT)
Dept: FAMILY MEDICINE CLINIC | Facility: CLINIC | Age: 35
End: 2025-06-23
Payer: COMMERCIAL

## 2025-06-23 VITALS
SYSTOLIC BLOOD PRESSURE: 132 MMHG | RESPIRATION RATE: 16 BRPM | WEIGHT: 315 LBS | DIASTOLIC BLOOD PRESSURE: 80 MMHG | BODY MASS INDEX: 42.66 KG/M2 | HEIGHT: 72 IN | HEART RATE: 86 BPM | TEMPERATURE: 97 F

## 2025-06-23 DIAGNOSIS — F33.42 RECURRENT MAJOR DEPRESSIVE DISORDER, IN FULL REMISSION: ICD-10-CM

## 2025-06-23 DIAGNOSIS — R79.89 LOW TESTOSTERONE IN MALE: Primary | ICD-10-CM

## 2025-06-23 PROCEDURE — 3008F BODY MASS INDEX DOCD: CPT | Performed by: FAMILY MEDICINE

## 2025-06-23 PROCEDURE — 99214 OFFICE O/P EST MOD 30 MIN: CPT | Performed by: FAMILY MEDICINE

## 2025-06-23 PROCEDURE — G2211 COMPLEX E/M VISIT ADD ON: HCPCS | Performed by: FAMILY MEDICINE

## 2025-06-23 PROCEDURE — 3079F DIAST BP 80-89 MM HG: CPT | Performed by: FAMILY MEDICINE

## 2025-06-23 PROCEDURE — 3075F SYST BP GE 130 - 139MM HG: CPT | Performed by: FAMILY MEDICINE

## 2025-06-23 NOTE — PROGRESS NOTES
[facility] Office Note  Chief Complaint:   Chief Complaint   Patient presents with    Medication Follow-Up     Also has bumps on right ring finger that have been there for months, no changes in size.        HPI:     History of Present Illness  Jorge Cristina is a 35 year old male who presents for medication management and follow-up.    He is currently prescribed Effexor for depression, which was initially started by a psychiatrist. He feels stable on the current dose. He has not yet started testosterone therapy due to issues with pharmacy prior authorization and prescription fills.    He is taking metformin, which was prescribed by Kim. He is interested in Zepbound but is facing insurance coverage issues.    He has a history of sleep apnea, which continues to affect him. He is concerned about the potential development of diabetes, given his family history of diabetes and his sleep apnea.    He has a finger with small bumps under the skin, which have been present for a while without worsening or causing pain.         Past Medical History[1]  Past Surgical History[2]  Social History:  Short Social Hx on File[3]  Family History:  Family History[4]  Allergies:  Allergies[5]  Current Meds:  Current Medications[6]   Counseling given: Not Answered       REVIEW OF SYSTEMS:   ROS:  CONSTITUTIONAL:  Denies any unusual weight gain/loss, fever, chills, weakness or fatigue.  CARDIOVASCULAR:  Denies chest pain, chest pressure or chest discomfort. No palpitations or edema.  Denies any dyspnea on exertion or at rest  RESPIRATORY:  Denies shortness of breath, cough  GASTROINTESTINAL:  Denies any abdominal pain, nausea, vomiting  NEUROLOGICAL:  Denies headache, dizziness, syncope, numbness or tingling in the extremities.  MUSCULOSKELETAL:  Denies muscle, back pain, joint pain or stiffness.  HEMATOLOGIC:  Denies anemia, bleeding or bruising.  LYMPHATICS:  Denies enlarged nodes.  PSYCHIATRIC:  + h/o  depression    EXAM:   /80   Pulse 86   Temp 97.3 °F (36.3 °C) (Temporal)   Resp 16   Ht 6' (1.829 m)   Wt (!) 363 lb (164.7 kg)   BMI 49.23 kg/m²  Estimated body mass index is 49.23 kg/m² as calculated from the following:    Height as of this encounter: 6' (1.829 m).    Weight as of this encounter: 363 lb (164.7 kg).   Vital signs reviewed.Appears stated age, well groomed.  Physical Exam:  GEN:  Patient is alert and oriented x3, no apparent distress  HEAD:  Normocephalic, atraumatic  SKIN: + tiny pink bumps over DIP joint of right finger finger  LUNGS: clear to auscultation bilaterally, no rales/rhonchi/wheezing  HEART:  Regular rate and rhythm, no murmurs, rubs or gallops  ABDOMEN:  Soft, nondistended, nontender, bowel sounds normal in all 4 quadrants, no hepatosplenomegaly  EXTREMITIES:  Strength intact with 5/5 bilaterally upper and lower extremities, no edema noted  NEURO:  CN 2 - 12 grossly intact     ASSESSMENT AND PLAN:   1. Low testosterone in male    2. Recurrent major depressive disorder, in full remission      Assessment & Plan  Low testosterone  Testosterone therapy not initiated due to pharmacy and prior authorization issues.  - Contact pharmacy to resolve testosterone prescription issues.  - Monitor testosterone levels 3 months after therapy initiation.    Obstructive sleep apnea (ANALISA)  He was informed of ANALISA risks, including potential diabetes progression due to family history.    Recurrent major depressive disorder, in full remission  Depression well-managed on venlafaxine. He requested continued management under current care due to insurance changes.  - Continue venlafaxine at current dose.    Calcium deposits on finger  Non-painful calcium deposits present, monitored for changes.  - Monitor calcium deposits for changes or pain.  - Consider referral for aspiration if deposits become painful.          Meds & Refills for this Visit:  Requested Prescriptions      No prescriptions requested  or ordered in this encounter       Health Maintenance:  Health Maintenance Due   Topic Date Due    COVID-19 Vaccine (4 - 2024-25 season) 09/01/2024    Annual Depression Screening  01/01/2025    Annual Physical  06/17/2025       Patient/Caregiver Education: Patient/Caregiver Education: There are no barriers to learning. Medical education done.   Outcome: Patient verbalizes understanding. Patient is notified to call with any questions, complications, allergies, or worsening or changing symptoms.  Patient is to call with any side effects or complications from the treatments as a result of today.     Problem List:  Problem List[7]    HOLLI COOL, DO    Please note that portions of this note may have been completed with a voice recognition program. Efforts were made to edit the dictations but occasionally words are mis-transcribed.         [1]   Past Medical History:   Cancer (HCC)    testicular    Depression    Sleep apnea   [2]   Past Surgical History:  Procedure Laterality Date    Cholecystectomy      Orchiectomy    2007    Other surgical history      wrist surgery   [3]   Social History  Socioeconomic History    Marital status: Single   Tobacco Use    Smoking status: Never    Smokeless tobacco: Never   Vaping Use    Vaping status: Never Used   Substance and Sexual Activity    Alcohol use: Yes     Comment: rarely    Drug use: Never    Sexual activity: Yes   Other Topics Concern    Caffeine Concern No    Exercise Yes    Seat Belt Yes    Special Diet No    Stress Concern Yes    Weight Concern Yes   [4]   Family History  Problem Relation Age of Onset    Depression Mother     Cancer Mother         thyroid    Diabetes Father     Lipids Father     Hypertension Father     Cancer Maternal Grandmother         breast    Cancer Maternal Grandfather         leukemia, melenoma    Diabetes Maternal Grandfather     Cancer Paternal Grandmother         esophageal   [5]   Allergies  Allergen Reactions    Dander Coughing,  ITCHING, SHORTNESS OF BREATH and Tightness in Chest    Ciprofloxacin NAUSEA ONLY    Shellfish Allergy ITCHING   [6]   Current Outpatient Medications   Medication Sig Dispense Refill    venlafaxine ER 75 MG Oral Capsule SR 24 Hr Take 3 capsules (225 mg total) by mouth daily. 90 capsule 0    metFORMIN  MG Oral Tablet 24 Hr Take 4 tablets (2,000 mg total) by mouth daily with food. 120 tablet 3    Testosterone Cypionate 100 MG/ML Intramuscular Solution Inject 1 mL (100 mg total) into the muscle every 14 (fourteen) days. 10 mL 0    Syringe/Needle, Disp, 23G X 1-1/2\" 3 ML Does not apply Misc Use for IM testosterone administration every 14 days 100 each 0    Syringe, Disposable, (MONOJECT SYRINGE) 3 ML Does not apply Misc Use for IM testosterone therapy 100 each 0    ergocalciferol 1.25 MG (53572 UT) Oral Cap Take 1 capsule (50,000 Units total) by mouth once a week. With food 12 capsule 1    Azelastine-Fluticasone 137-50 MCG/ACT Nasal Suspension 2 sprays by Nasal route daily.      EPINEPHrine 0.3 MG/0.3ML Injection Solution Auto-injector Inject 0.3 mL (1 each total) into the muscle as needed.      fexofenadine 180 MG Oral Tab Take 1 tablet (180 mg total) by mouth daily.      Misc Natural Products (BEET ROOT OR)       MAGNESIUM GLYCINATE OR       Amylase-Lipase-Protease (DIGESTIVE ENZYMES OR)       PSYLLIUM HUSK OR  (Patient not taking: Reported on 6/23/2025)     [7]   Patient Active Problem List  Diagnosis    Tenosynovitis of foot and ankle    Unspecified internal derangement of knee    Ganglion, unspecified    Body mass index 39.0-39.9, adult    Recurrent major depressive disorder, in full remission    Encounter for therapeutic drug monitoring    Obesity, unspecified    ANALISA on CPAP    Insulin resistance    History of cholecystectomy

## 2025-06-23 NOTE — PROGRESS NOTES
The following individual(s) verbally consented to be recorded using ambient AI listening technology and understand that they can each withdraw their consent to this listening technology at any point by asking the clinician to turn off or pause the recording:    Patient name: Jorge Cristina  Additional names:

## 2025-06-24 DIAGNOSIS — E55.9 VITAMIN D DEFICIENCY: ICD-10-CM

## 2025-06-25 RX ORDER — ERGOCALCIFEROL 1.25 MG/1
50000 CAPSULE, LIQUID FILLED ORAL WEEKLY
Qty: 12 CAPSULE | Refills: 1 | OUTPATIENT
Start: 2025-06-25

## 2025-07-15 RX ORDER — VENLAFAXINE HYDROCHLORIDE 75 MG/1
225 CAPSULE, EXTENDED RELEASE ORAL DAILY
Qty: 90 CAPSULE | Refills: 0 | OUTPATIENT
Start: 2025-07-15 | End: 2025-10-13

## 2025-07-15 NOTE — TELEPHONE ENCOUNTER
LOV:  6/23/25  Future Visit: none   Last Rx: 6/11/25 90 caps 0 refill   Last Labs: 4/3/25   Per protocol to provider

## 2025-07-28 RX ORDER — VENLAFAXINE HYDROCHLORIDE 75 MG/1
225 CAPSULE, EXTENDED RELEASE ORAL DAILY
Qty: 270 CAPSULE | Refills: 1 | Status: SHIPPED | OUTPATIENT
Start: 2025-07-28

## 2025-07-28 NOTE — TELEPHONE ENCOUNTER
LOV:06/23/2025      NOV:not scheduled     Medication: Venlafaxine Er 75 mg 3 capsules daily

## 2025-08-26 PROBLEM — K21.9 GERD (GASTROESOPHAGEAL REFLUX DISEASE): Status: ACTIVE | Noted: 2025-08-26

## 2025-08-26 PROBLEM — F32.A DEPRESSION: Status: ACTIVE | Noted: 2025-08-26

## 2025-08-26 PROBLEM — E34.9 TESTOSTERONE DEFICIENCY: Status: ACTIVE | Noted: 2025-08-26

## 2025-08-27 ENCOUNTER — OFFICE VISIT (OUTPATIENT)
Dept: INTERNAL MEDICINE CLINIC | Facility: CLINIC | Age: 35
End: 2025-08-27

## 2025-08-27 VITALS
SYSTOLIC BLOOD PRESSURE: 132 MMHG | RESPIRATION RATE: 18 BRPM | OXYGEN SATURATION: 97 % | DIASTOLIC BLOOD PRESSURE: 86 MMHG | WEIGHT: 315 LBS | BODY MASS INDEX: 42.66 KG/M2 | HEIGHT: 72 IN | HEART RATE: 75 BPM

## 2025-08-27 DIAGNOSIS — E88.819 INSULIN RESISTANCE: ICD-10-CM

## 2025-08-27 DIAGNOSIS — E66.813 CLASS 3 SEVERE OBESITY WITH SERIOUS COMORBIDITY AND BODY MASS INDEX (BMI) OF 45.0 TO 49.9 IN ADULT, UNSPECIFIED OBESITY TYPE: ICD-10-CM

## 2025-08-27 DIAGNOSIS — Z51.81 ENCOUNTER FOR THERAPEUTIC DRUG MONITORING: Primary | ICD-10-CM

## 2025-08-27 DIAGNOSIS — G47.33 OSA ON CPAP: ICD-10-CM

## 2025-08-27 PROCEDURE — 3079F DIAST BP 80-89 MM HG: CPT | Performed by: NURSE PRACTITIONER

## 2025-08-27 PROCEDURE — 3008F BODY MASS INDEX DOCD: CPT | Performed by: NURSE PRACTITIONER

## 2025-08-27 PROCEDURE — 3075F SYST BP GE 130 - 139MM HG: CPT | Performed by: NURSE PRACTITIONER

## 2025-08-27 PROCEDURE — 99213 OFFICE O/P EST LOW 20 MIN: CPT | Performed by: NURSE PRACTITIONER

## (undated) NOTE — Clinical Note
Thank you for referring Jorge to the [facility]. Consult was completed today via clinic.  I have ordered labs, referred for a nutrition consultation with our dietician, and counselor.  I counseled on the importance of lifestyle intervention in adjunct with medication and started Zepbound for treatment with follow-up advised in about 3-4 months.